# Patient Record
Sex: FEMALE | Race: WHITE | NOT HISPANIC OR LATINO | Employment: FULL TIME | URBAN - METROPOLITAN AREA
[De-identification: names, ages, dates, MRNs, and addresses within clinical notes are randomized per-mention and may not be internally consistent; named-entity substitution may affect disease eponyms.]

---

## 2023-05-04 ENCOUNTER — TELEPHONE (OUTPATIENT)
Dept: PRIMARY CARE | Facility: CLINIC | Age: 53
End: 2023-05-04
Payer: COMMERCIAL

## 2023-05-04 NOTE — TELEPHONE ENCOUNTER
Patient would like to have labs done prior to her appt next week. States shes had some light headedness the last two weeks. She would like to review labs with you at time of visit.

## 2023-05-09 ENCOUNTER — OFFICE VISIT (OUTPATIENT)
Dept: PRIMARY CARE | Facility: CLINIC | Age: 53
End: 2023-05-09
Payer: COMMERCIAL

## 2023-05-09 VITALS
DIASTOLIC BLOOD PRESSURE: 82 MMHG | WEIGHT: 173 LBS | TEMPERATURE: 98.1 F | SYSTOLIC BLOOD PRESSURE: 124 MMHG | BODY MASS INDEX: 28.14 KG/M2

## 2023-05-09 DIAGNOSIS — H65.113 ACUTE ALLERGIC SEROUS OTITIS MEDIA, BILATERAL: ICD-10-CM

## 2023-05-09 DIAGNOSIS — R42 DIZZINESSES: Primary | ICD-10-CM

## 2023-05-09 PROBLEM — G43.909 MIGRAINE HEADACHE: Status: ACTIVE | Noted: 2023-05-09

## 2023-05-09 PROBLEM — N28.9 RENAL INSUFFICIENCY: Status: ACTIVE | Noted: 2023-05-09

## 2023-05-09 PROBLEM — M54.16 LUMBAR RADICULOPATHY: Status: ACTIVE | Noted: 2023-05-09

## 2023-05-09 PROCEDURE — 1036F TOBACCO NON-USER: CPT | Performed by: NURSE PRACTITIONER

## 2023-05-09 PROCEDURE — 99213 OFFICE O/P EST LOW 20 MIN: CPT | Performed by: NURSE PRACTITIONER

## 2023-05-09 RX ORDER — ACETAMINOPHEN 500 MG
1 TABLET ORAL
COMMUNITY

## 2023-05-09 RX ORDER — FLUTICASONE PROPIONATE 50 MCG
1 SPRAY, SUSPENSION (ML) NASAL DAILY
Qty: 16 G | Refills: 0 | Status: SHIPPED | OUTPATIENT
Start: 2023-05-09 | End: 2024-05-08

## 2023-05-09 RX ORDER — MAGNESIUM 250 MG
TABLET ORAL
COMMUNITY

## 2023-05-09 RX ORDER — SUMATRIPTAN SUCCINATE 100 MG/1
TABLET ORAL
COMMUNITY
End: 2023-11-13 | Stop reason: SDUPTHER

## 2023-05-09 ASSESSMENT — ENCOUNTER SYMPTOMS
CARDIOVASCULAR NEGATIVE: 1
CONSTITUTIONAL NEGATIVE: 1
MUSCULOSKELETAL NEGATIVE: 1
RESPIRATORY NEGATIVE: 1
GASTROINTESTINAL NEGATIVE: 1

## 2023-05-09 ASSESSMENT — PATIENT HEALTH QUESTIONNAIRE - PHQ9
SUM OF ALL RESPONSES TO PHQ9 QUESTIONS 1 AND 2: 0
1. LITTLE INTEREST OR PLEASURE IN DOING THINGS: NOT AT ALL
2. FEELING DOWN, DEPRESSED OR HOPELESS: NOT AT ALL

## 2023-05-09 NOTE — PROGRESS NOTES
Subjective   Patient ID: Margareth Wolf is a 53 y.o. female who presents for Dizziness (X 2-3 weeks).    HPI Presents today for intermittent dizziness for the past week or so  Can happen anytime throughout the day. Not ore than 1-2 times a day  Last for a few minutes and then goes away.  If busy she will not notice it.   Left ear feels full.  NO headache, sleeping well   Gave up 1 cup of coffee in the afternoon about the same time this started    Review of Systems   Constitutional: Negative.    HENT:          As noted in HPI   Respiratory: Negative.     Cardiovascular: Negative.    Gastrointestinal: Negative.    Musculoskeletal: Negative.        Objective   /82 (BP Location: Left arm, Patient Position: Sitting)   Temp 36.7 °C (98.1 °F) (Temporal)   Wt 78.5 kg (173 lb)   BMI 28.14 kg/m²     Physical Exam  Constitutional:       Appearance: Normal appearance.   HENT:      Head: Normocephalic and atraumatic.      Right Ear: Ear canal and external ear normal. A middle ear effusion is present.      Left Ear: Ear canal and external ear normal. A middle ear effusion is present.      Nose: Nose normal.      Mouth/Throat:      Mouth: Mucous membranes are dry.      Pharynx: Oropharynx is clear.   Cardiovascular:      Rate and Rhythm: Normal rate and regular rhythm.   Pulmonary:      Effort: Pulmonary effort is normal.      Breath sounds: Normal breath sounds.   Musculoskeletal:         General: Normal range of motion.   Neurological:      General: No focal deficit present.      Mental Status: She is alert.   Psychiatric:         Mood and Affect: Mood normal.         Behavior: Behavior normal.         Assessment/Plan   Problem List Items Addressed This Visit    None  Visit Diagnoses       Dizzinesses    -  Primary    Acute allergic serous otitis media, bilateral        Relevant Medications    fluticasone (Flonase) 50 mcg/actuation nasal spray

## 2023-11-13 ENCOUNTER — LAB (OUTPATIENT)
Dept: LAB | Facility: LAB | Age: 53
End: 2023-11-13
Payer: COMMERCIAL

## 2023-11-13 ENCOUNTER — OFFICE VISIT (OUTPATIENT)
Dept: PRIMARY CARE | Facility: CLINIC | Age: 53
End: 2023-11-13
Payer: COMMERCIAL

## 2023-11-13 VITALS
HEART RATE: 71 BPM | OXYGEN SATURATION: 98 % | WEIGHT: 165 LBS | TEMPERATURE: 97.7 F | BODY MASS INDEX: 27.49 KG/M2 | HEIGHT: 65 IN | DIASTOLIC BLOOD PRESSURE: 82 MMHG | SYSTOLIC BLOOD PRESSURE: 132 MMHG

## 2023-11-13 DIAGNOSIS — Z00.00 HEALTHCARE MAINTENANCE: ICD-10-CM

## 2023-11-13 DIAGNOSIS — G43.119 INTRACTABLE MIGRAINE WITH AURA WITHOUT STATUS MIGRAINOSUS: ICD-10-CM

## 2023-11-13 DIAGNOSIS — Z23 NEED FOR VACCINATION: ICD-10-CM

## 2023-11-13 DIAGNOSIS — Z00.00 HEALTHCARE MAINTENANCE: Primary | ICD-10-CM

## 2023-11-13 DIAGNOSIS — D32.0 INTRACRANIAL MENINGIOMA (MULTI): ICD-10-CM

## 2023-11-13 LAB
ALBUMIN SERPL BCP-MCNC: 4.5 G/DL (ref 3.4–5)
ALP SERPL-CCNC: 65 U/L (ref 33–110)
ALT SERPL W P-5'-P-CCNC: 7 U/L (ref 7–45)
ANION GAP SERPL CALC-SCNC: 11 MMOL/L (ref 10–20)
AST SERPL W P-5'-P-CCNC: 13 U/L (ref 9–39)
BASOPHILS # BLD AUTO: 0.09 X10*3/UL (ref 0–0.1)
BASOPHILS NFR BLD AUTO: 1.1 %
BILIRUB SERPL-MCNC: 0.6 MG/DL (ref 0–1.2)
BUN SERPL-MCNC: 19 MG/DL (ref 6–23)
CALCIUM SERPL-MCNC: 9.5 MG/DL (ref 8.6–10.3)
CHLORIDE SERPL-SCNC: 104 MMOL/L (ref 98–107)
CHOLEST SERPL-MCNC: 206 MG/DL (ref 0–199)
CHOLESTEROL/HDL RATIO: 4.4
CO2 SERPL-SCNC: 27 MMOL/L (ref 21–32)
CREAT SERPL-MCNC: 0.95 MG/DL (ref 0.5–1.05)
EOSINOPHIL # BLD AUTO: 0.19 X10*3/UL (ref 0–0.7)
EOSINOPHIL NFR BLD AUTO: 2.3 %
ERYTHROCYTE [DISTWIDTH] IN BLOOD BY AUTOMATED COUNT: 12.7 % (ref 11.5–14.5)
GFR SERPL CREATININE-BSD FRML MDRD: 71 ML/MIN/1.73M*2
GLUCOSE SERPL-MCNC: 89 MG/DL (ref 74–99)
HCT VFR BLD AUTO: 40.7 % (ref 36–46)
HDLC SERPL-MCNC: 46.7 MG/DL
HGB BLD-MCNC: 13.3 G/DL (ref 12–16)
IMM GRANULOCYTES # BLD AUTO: 0.03 X10*3/UL (ref 0–0.7)
IMM GRANULOCYTES NFR BLD AUTO: 0.4 % (ref 0–0.9)
LDLC SERPL CALC-MCNC: 129 MG/DL
LYMPHOCYTES # BLD AUTO: 2.81 X10*3/UL (ref 1.2–4.8)
LYMPHOCYTES NFR BLD AUTO: 33.6 %
MCH RBC QN AUTO: 29.1 PG (ref 26–34)
MCHC RBC AUTO-ENTMCNC: 32.7 G/DL (ref 32–36)
MCV RBC AUTO: 89 FL (ref 80–100)
MONOCYTES # BLD AUTO: 0.52 X10*3/UL (ref 0.1–1)
MONOCYTES NFR BLD AUTO: 6.2 %
NEUTROPHILS # BLD AUTO: 4.73 X10*3/UL (ref 1.2–7.7)
NEUTROPHILS NFR BLD AUTO: 56.4 %
NON HDL CHOLESTEROL: 159 MG/DL (ref 0–149)
NRBC BLD-RTO: 0 /100 WBCS (ref 0–0)
PLATELET # BLD AUTO: 307 X10*3/UL (ref 150–450)
POTASSIUM SERPL-SCNC: 3.9 MMOL/L (ref 3.5–5.3)
PROT SERPL-MCNC: 7 G/DL (ref 6.4–8.2)
RBC # BLD AUTO: 4.57 X10*6/UL (ref 4–5.2)
SODIUM SERPL-SCNC: 138 MMOL/L (ref 136–145)
TRIGL SERPL-MCNC: 150 MG/DL (ref 0–149)
TSH SERPL-ACNC: 3.68 MIU/L (ref 0.44–3.98)
VLDL: 30 MG/DL (ref 0–40)
WBC # BLD AUTO: 8.4 X10*3/UL (ref 4.4–11.3)

## 2023-11-13 PROCEDURE — 80053 COMPREHEN METABOLIC PANEL: CPT

## 2023-11-13 PROCEDURE — 1036F TOBACCO NON-USER: CPT | Performed by: NURSE PRACTITIONER

## 2023-11-13 PROCEDURE — 85025 COMPLETE CBC W/AUTO DIFF WBC: CPT

## 2023-11-13 PROCEDURE — 84443 ASSAY THYROID STIM HORMONE: CPT

## 2023-11-13 PROCEDURE — 90715 TDAP VACCINE 7 YRS/> IM: CPT | Performed by: NURSE PRACTITIONER

## 2023-11-13 PROCEDURE — 99396 PREV VISIT EST AGE 40-64: CPT | Performed by: NURSE PRACTITIONER

## 2023-11-13 PROCEDURE — 36415 COLL VENOUS BLD VENIPUNCTURE: CPT

## 2023-11-13 PROCEDURE — 80061 LIPID PANEL: CPT

## 2023-11-13 PROCEDURE — 90471 IMMUNIZATION ADMIN: CPT | Performed by: NURSE PRACTITIONER

## 2023-11-13 RX ORDER — SUMATRIPTAN SUCCINATE 100 MG/1
100 TABLET ORAL ONCE AS NEEDED
Qty: 9 TABLET | Refills: 3 | Status: SHIPPED | OUTPATIENT
Start: 2023-11-13 | End: 2024-11-12

## 2023-11-13 ASSESSMENT — ENCOUNTER SYMPTOMS
GASTROINTESTINAL NEGATIVE: 1
NEUROLOGICAL NEGATIVE: 1
MUSCULOSKELETAL NEGATIVE: 1
CONSTITUTIONAL NEGATIVE: 1
PSYCHIATRIC NEGATIVE: 1
CARDIOVASCULAR NEGATIVE: 1
RESPIRATORY NEGATIVE: 1

## 2023-11-13 NOTE — PROGRESS NOTES
"Subjective   Patient ID: Margareth Wolf is a 53 y.o. female who presents for Annual Exam (Patient has GYN-Dr. Monae Isbell).    HPI   Presents today for Health maintnenace exam  MRI yeary for  intracarnial  meingioma  Migraines much better, much vesna frequent.  Going to chiropractor. , through [facility] and using flonase.  Overall health is excellent  Dental cleaning every 6 month.  Brush and floss 2 times daily.   Eye exam yearly. Uses readers.   No hearng loss  Skin cehcks yearly.   Well baalnced diet, down 11 pounds.   Exercises walks 39117 steps daily and weight.   Immiunizatiosn as noted.  Due for tdap  Paps normal, mammogram normal 11/1/2023.  Colonoscopy 7/2023 2 polyps removed 5 year recheck      Review of Systems   Constitutional: Negative.    HENT: Negative.     Respiratory: Negative.     Cardiovascular: Negative.    Gastrointestinal: Negative.    Genitourinary: Negative.    Musculoskeletal: Negative.    Skin: Negative.    Neurological: Negative.    Psychiatric/Behavioral: Negative.         Objective   /88 (BP Location: Right arm, Patient Position: Sitting)   Pulse 71   Temp 36.5 °C (97.7 °F) (Temporal)   Ht 1.651 m (5' 5\")   Wt 74.8 kg (165 lb)   SpO2 98%   BMI 27.46 kg/m²     Physical Exam  Constitutional:       Appearance: Normal appearance.   HENT:      Right Ear: Tympanic membrane, ear canal and external ear normal.      Left Ear: Tympanic membrane, ear canal and external ear normal.      Nose: Nose normal.      Mouth/Throat:      Mouth: Mucous membranes are moist.      Pharynx: Oropharynx is clear.   Eyes:      Pupils: Pupils are equal, round, and reactive to light.   Cardiovascular:      Rate and Rhythm: Normal rate and regular rhythm.      Pulses: Normal pulses.   Pulmonary:      Effort: Pulmonary effort is normal.   Abdominal:      General: Abdomen is flat. Bowel sounds are normal.      Palpations: Abdomen is soft.   Musculoskeletal:         General: Normal range of motion.      " Cervical back: Normal range of motion.   Lymphadenopathy:      Cervical: No cervical adenopathy.   Skin:     General: Skin is warm.   Neurological:      General: No focal deficit present.      Mental Status: She is alert and oriented to person, place, and time.   Psychiatric:         Mood and Affect: Mood normal.         Behavior: Behavior normal.         Assessment/Plan   Problem List Items Addressed This Visit             ICD-10-CM    Migraine headache G43.909    Relevant Medications    SUMAtriptan (Imitrex) 100 mg tablet    Intracranial meningioma (CMS/HCC) D32.0     Other Visit Diagnoses         Codes    Healthcare maintenance    -  Primary Z00.00    Relevant Orders    Comprehensive Metabolic Panel    Lipid Panel    Thyroid Stimulating Hormone    CBC and Auto Differential    Need for vaccination     Z23    Relevant Orders    Tdap vaccine, age 7 years and older  (BOOSTRIX) (Completed)

## 2023-11-13 NOTE — PATIENT INSTRUCTIONS
I will follow up with the blood work.  Tdap updated today.   Follow up in 1 year and as needed.   Medications renewed.

## 2024-02-20 ENCOUNTER — TELEPHONE (OUTPATIENT)
Dept: PRIMARY CARE | Facility: CLINIC | Age: 54
End: 2024-02-20
Payer: COMMERCIAL

## 2024-02-20 DIAGNOSIS — R06.83 SNORING: Primary | ICD-10-CM

## 2024-02-20 DIAGNOSIS — R53.83 FATIGUE, UNSPECIFIED TYPE: ICD-10-CM

## 2024-02-20 NOTE — TELEPHONE ENCOUNTER
Patient informed of above information and verbalized understanding, gave contact info for scheduling

## 2024-02-20 NOTE — TELEPHONE ENCOUNTER
Patient requesting referral for sleep apnea clinic for testing, states she has been snoring and having bad headaches in the mornings. Would like to see if this is why she is having these headaches in the mornings. Please advise

## 2024-04-09 ENCOUNTER — OFFICE VISIT (OUTPATIENT)
Dept: PRIMARY CARE | Facility: CLINIC | Age: 54
End: 2024-04-09
Payer: COMMERCIAL

## 2024-04-09 ENCOUNTER — LAB (OUTPATIENT)
Dept: LAB | Facility: LAB | Age: 54
End: 2024-04-09
Payer: COMMERCIAL

## 2024-04-09 VITALS
DIASTOLIC BLOOD PRESSURE: 82 MMHG | SYSTOLIC BLOOD PRESSURE: 120 MMHG | TEMPERATURE: 98.1 F | OXYGEN SATURATION: 98 % | HEART RATE: 78 BPM | WEIGHT: 161 LBS | HEIGHT: 65 IN | BODY MASS INDEX: 26.82 KG/M2

## 2024-04-09 DIAGNOSIS — R00.0 TACHYCARDIA: Primary | ICD-10-CM

## 2024-04-09 DIAGNOSIS — R00.0 TACHYCARDIA: ICD-10-CM

## 2024-04-09 DIAGNOSIS — E78.2 MIXED HYPERLIPIDEMIA: ICD-10-CM

## 2024-04-09 LAB
ALBUMIN SERPL BCP-MCNC: 4.6 G/DL (ref 3.4–5)
ALP SERPL-CCNC: 60 U/L (ref 33–110)
ALT SERPL W P-5'-P-CCNC: 9 U/L (ref 7–45)
ANION GAP SERPL CALC-SCNC: 11 MMOL/L (ref 10–20)
AST SERPL W P-5'-P-CCNC: 15 U/L (ref 9–39)
BILIRUB SERPL-MCNC: 0.5 MG/DL (ref 0–1.2)
BUN SERPL-MCNC: 21 MG/DL (ref 6–23)
CALCIUM SERPL-MCNC: 9.7 MG/DL (ref 8.6–10.3)
CHLORIDE SERPL-SCNC: 105 MMOL/L (ref 98–107)
CO2 SERPL-SCNC: 29 MMOL/L (ref 21–32)
CREAT SERPL-MCNC: 0.95 MG/DL (ref 0.5–1.05)
EGFRCR SERPLBLD CKD-EPI 2021: 70 ML/MIN/1.73M*2
ERYTHROCYTE [DISTWIDTH] IN BLOOD BY AUTOMATED COUNT: 12.5 % (ref 11.5–14.5)
GLUCOSE SERPL-MCNC: 77 MG/DL (ref 74–99)
HCT VFR BLD AUTO: 39.3 % (ref 36–46)
HGB BLD-MCNC: 13 G/DL (ref 12–16)
MCH RBC QN AUTO: 28.9 PG (ref 26–34)
MCHC RBC AUTO-ENTMCNC: 33.1 G/DL (ref 32–36)
MCV RBC AUTO: 87 FL (ref 80–100)
NRBC BLD-RTO: 0 /100 WBCS (ref 0–0)
PLATELET # BLD AUTO: 330 X10*3/UL (ref 150–450)
POTASSIUM SERPL-SCNC: 4.4 MMOL/L (ref 3.5–5.3)
PROT SERPL-MCNC: 7.4 G/DL (ref 6.4–8.2)
RBC # BLD AUTO: 4.5 X10*6/UL (ref 4–5.2)
SODIUM SERPL-SCNC: 141 MMOL/L (ref 136–145)
TSH SERPL-ACNC: 2.56 MIU/L (ref 0.44–3.98)
WBC # BLD AUTO: 8.3 X10*3/UL (ref 4.4–11.3)

## 2024-04-09 PROCEDURE — 99214 OFFICE O/P EST MOD 30 MIN: CPT | Performed by: NURSE PRACTITIONER

## 2024-04-09 PROCEDURE — 93000 ELECTROCARDIOGRAM COMPLETE: CPT | Performed by: NURSE PRACTITIONER

## 2024-04-09 PROCEDURE — 36415 COLL VENOUS BLD VENIPUNCTURE: CPT

## 2024-04-09 PROCEDURE — 85027 COMPLETE CBC AUTOMATED: CPT

## 2024-04-09 PROCEDURE — 80053 COMPREHEN METABOLIC PANEL: CPT

## 2024-04-09 PROCEDURE — 1036F TOBACCO NON-USER: CPT | Performed by: NURSE PRACTITIONER

## 2024-04-09 PROCEDURE — 84443 ASSAY THYROID STIM HORMONE: CPT

## 2024-04-09 RX ORDER — TRETINOIN 0.25 MG/G
CREAM TOPICAL
COMMUNITY
Start: 2024-03-18

## 2024-04-09 ASSESSMENT — ENCOUNTER SYMPTOMS
PALPITATIONS: 0
RESPIRATORY NEGATIVE: 1
MUSCULOSKELETAL NEGATIVE: 1
PSYCHIATRIC NEGATIVE: 1
CONSTITUTIONAL NEGATIVE: 1
NEUROLOGICAL NEGATIVE: 1
CARDIOVASCULAR NEGATIVE: 1

## 2024-04-09 NOTE — PROGRESS NOTES
"Subjective   Patient ID: Margareth Wolf is a 54 y.o. female who presents for Rapid Heart Rate.    HPI Presents today for  a few episodes where she noted her heart was racing. One time she was watching TV and noticed it was racing.  She looked at her watch and it said her heart rate was 130.  It lasted a few minutes and then went away  Not short of breath  Can feel with activity and with rest  Feels ok otherwise.  Not feeling anxious, stress does not a trigger.  Started 4 weeks ago.  Happened for a few weeks on occasion and now in the last 2 weeks no issue   Not taking any over the counter  medication.  Takes Magnesium and vitamin d daily  No issue with exercise.   Blood work 11/2023 reviewed as noted  Is wondering of she should do the coronary calcium score  Review of Systems   Constitutional: Negative.    Respiratory: Negative.     Cardiovascular: Negative.  Negative for chest pain, palpitations and leg swelling.        As noted in HPI     Musculoskeletal: Negative.    Neurological: Negative.    Psychiatric/Behavioral: Negative.         Objective   /82 (BP Location: Left arm, Patient Position: Sitting)   Pulse 78   Temp 36.7 °C (98.1 °F) (Temporal)   Ht 1.651 m (5' 5\")   Wt 73 kg (161 lb)   SpO2 98%   BMI 26.79 kg/m²     Physical Exam  Constitutional:       Appearance: Normal appearance.   Cardiovascular:      Rate and Rhythm: Normal rate and regular rhythm.   Pulmonary:      Effort: Pulmonary effort is normal.      Breath sounds: Normal breath sounds.   Musculoskeletal:         General: Normal range of motion.   Neurological:      General: No focal deficit present.      Mental Status: She is alert.   Psychiatric:         Mood and Affect: Mood normal.         Behavior: Behavior normal.         Assessment/Plan   Problem List Items Addressed This Visit    None  Visit Diagnoses         Codes    Tachycardia    -  Primary R00.0    Relevant Orders    ECG 12 lead (Clinic Performed)    TSH with reflex to Free " T4 if abnormal    CBC    Comprehensive Metabolic Panel    Mixed hyperlipidemia     E78.2    Relevant Orders    CT cardiac scoring wo IV contrast

## 2024-04-09 NOTE — PATIENT INSTRUCTIONS
EKG today is normal.    I will follow up with the blood work.   Please have the calcium score done.   If returns will consider and Holter monitor

## 2024-04-29 ENCOUNTER — OFFICE VISIT (OUTPATIENT)
Dept: SLEEP MEDICINE | Facility: CLINIC | Age: 54
End: 2024-04-29
Payer: COMMERCIAL

## 2024-04-29 VITALS
BODY MASS INDEX: 27.49 KG/M2 | DIASTOLIC BLOOD PRESSURE: 79 MMHG | HEART RATE: 83 BPM | SYSTOLIC BLOOD PRESSURE: 116 MMHG | TEMPERATURE: 98 F | WEIGHT: 165 LBS | HEIGHT: 65 IN | OXYGEN SATURATION: 100 %

## 2024-04-29 DIAGNOSIS — G47.30 SLEEP APNEA, UNSPECIFIED TYPE: Primary | ICD-10-CM

## 2024-04-29 DIAGNOSIS — R53.83 FATIGUE, UNSPECIFIED TYPE: ICD-10-CM

## 2024-04-29 DIAGNOSIS — R06.83 SNORING: ICD-10-CM

## 2024-04-29 PROCEDURE — 1036F TOBACCO NON-USER: CPT | Performed by: PHYSICIAN ASSISTANT

## 2024-04-29 PROCEDURE — 99203 OFFICE O/P NEW LOW 30 MIN: CPT | Performed by: PHYSICIAN ASSISTANT

## 2024-04-29 NOTE — PATIENT INSTRUCTIONS
[facility] Sleep Medicine  [address]  [address] [address]       NAME: Margareth Wolf   DATE: 04/29/24    Your Sleep Provider Today: Dorene Yuen PA-C  Your Primary Care Physician: KAYLEN Puentes   Your Referring Provider: Belinda Asencio APRN-C*    DIAGNOSIS:   1. Sleep apnea, unspecified type  Home sleep apnea test (HSAT)      2. Snoring  Referral to Adult Sleep Medicine      3. Fatigue, unspecified type  Referral to Adult Sleep Medicine          Thank you for coming to the Sleep Medicine Clinic today! Your sleep medicine provider today was: Dorene Yuen PA-C Below is a summary of your treatment plan, other important information, and our contact numbers:      TREATMENT PLAN       Instructions - Common BHAKTI Recs: - Avoid alcohol or sedatives several hours prior to sleeping.   - Avoid driving or operating heavy machinery when drowsy. A person driving while sleepy is five (5) times more likely to have an accident. If you feel sleepy, pull over and take a short power nap (sleep for less than 30 minutes). Otherwise, ask somebody to drive you.          Follow-up Appointment:   With Ade wu Christine       IMPORTANT INFORMATION     Call 911 for medical emergencies.  Our offices are generally open from Monday-Friday, 9 am - 5 pm.  If you need to get in touch with me, you may either call me and my team(number is below) or you can use Whyd.  If a referral for a test, for CPAP, or for another specialist was made, and you have not heard about scheduling this within a week, please call scheduling at [phone]).  If you are unable to make your appointment for clinic or an overnight study, kindly call the office at least 48 hours in advance to cancel and reschedule.  If you are on CPAP, please bring your device's card or the device to each clinic appointment.   There are no supporting services by either the sleep doctors or their  staff on weekends and Holidays, or after 5 PM on weekdays.   If you have been asked to come to a sleep study, make sure you bring toiletries, a comfy pillow, and any nighttime medications that you may regularly take. Also be sure to eat dinner before you arrive. We generally do not provide meals.      PRESCRIPTIONS     We require 7 days advanced notice for prescription refills. If we do not receive the request in this time, we cannot guarantee that your medication will be refilled in time.      IMPORTANT PHONE NUMBERS     Sleep Medicine Clinic Fax: [phone]  Appointments (for Adult Sleep Clinic): [phone] - option 2  Appointments (For Sleep Studies): [phone] - option 3  Behavioral Sleep Medicine: [phone]  Sleep Surgery: [phone]  ENT (Otolaryngology): [phone]  Headache Clinic (Neurology): [phone]  Neurology: [phone]  Psychiatry: [phone]  Pulmonary Function Testing (PFT) Center: [phone]  Pulmonary Medicine: [phone]  [employer] (DME): [phone]  [employer] (DME): [phone]  [facility] ([facility]): [phone]      OUR ADULT SLEEP MEDICINE TEAM   Please do not hesitate to call the office or sleep nurse with any questions between appointments:    Adult Sleep Nurses (Preethi Menjivar, RN and Tammy Canada RN):  For clinical questions and refilling prescriptions: [phone]  Email sleep diaries and other documents at: [email or web address]    Adult Sleep Medicine Secretaries:  Buffy Mckeon (For Luis Enrique/[place]/Yeh/Rodrigues):   P: [phone]  F: [phone]  Angy Camacho (For Soares/[place]biller): P: [phone]  Fax: [phone]  Oumou Verma (For Jurcevic/Blank): P: [phone]  F: [phone]  Laverne Silverman (For [place]): P: [phone]  F: [phone]  Carey Marino (For Heidi/Waqas/Daniel): P: [phone]  F: [phone]  Isa Nazario (For Francois/Elpidio): P: [phone]  F:  [phone]     Adult Sleep Medicine Advanced Practice Providers:  Marcus Nunez ([place], [place])  Cheyenne Alan ([facility])  Ade Reed CNP ([place], [place], [place])  Christina Yuen CNP ([place], [place], [place])  Ana Kelley ([place], [place], [place])  Kathi Magallanes CNP ([place], [place])        OUR SLEEP TESTING LOCATIONS     Our team will contact you to schedule your sleep study, however, you can contact us as follow:  Main Phone Line (scheduling only): [phone]), option 3  Adult and Pediatric Locations   [place] (6 years and older): [facility] - 4th floor ([address]) After hours line: [phone]  [facility] (Main campus: All ages): [facility], 6th floor. After hours line: [phone]   [place] (5 years and older; younger considered on case-by-case basis): [address], [address]. Scheduling  After hours line: [phone]   [place] (6 years and older): [address]; [address]; [address]   [place] (6 years and older): [address] [facility] [address] [facility], [address]  After hours line: [phone]   [place] (13 years and older) in [place]: [address], 2nd floor  After hours line: [phone]   [place] (13 year and older): [address]  After hours line: [phone]     Adult Only Locations:   [place] (18 years and older): [address] [facility], 2nd floor   [place] (18 years and older): [address] [facility]; 4th floor  After hours line: [phone]   [place] (18 years and older) at [place]: [id number] [place] [address]  After hours line: [phone]          CONTACTING YOUR SLEEP MEDICINE PROVIDER     Send a message directly to your provider through \"My Chart\", which is the email service through your  Records Account: [email or web address]   Call [phone] and leave a message. One of the " "administrative assistants will forward the message to your sleep medicine provider through \"My Chart\" and/or email.     Your sleep medicine provider for this visit was: Dorene Yuen PA-C    "

## 2024-04-29 NOTE — PROGRESS NOTES
Patient: Margareth Wolf    [id number]  : 1970 -- AGE 54 y.o.    Provider: Dorene Yuen PA-C     Location [facility]   Service Date: 2024              [facility] Sleep Medicine Clinic  New Visit Note      HISTORY OF PRESENT ILLNESS     The patient's referring provider is: Belinda Asencio, ELI*; KAYLEN Puentes    HISTORY OF PRESENT ILLNESS   Margareth Wolf is a 54 y.o. female with h/o migraine, lumbar radiculopathy  who presents to a [facility] Sleep Medicine Clinic for a sleep medicine evaluation with concerns of New Patient Visit (New patient evaluation ).     Past Sleep History  Patient has not had a sleep study in the past.        Current History    On today's visit, the patient reports he is here and concerned that she may have sleep apnea.  She reports that her mother and one of her siblings have it.  She reports that she has a history of snoring, noted to be more mild over the past several years but does feel that has been going on for most of her adult life.  She reports that she overall feels that she sleeps pretty good, does endorse some nocturia though.  She does not wake up any gasping or choking sensation.  She does report at times in the morning does have a history of migraines.  Does have a history of clenching and does have a mouthguard.  She reports that since having mouthguard that also seem to be helping her headaches as well.  She reports that when she does get up in the middle of the night, she has returned to sleep with ease.  Scheduled and as below.  She denies any new sleep concerns at this time including restless leg syndrome, parasomnias or other concerns.  Denies any issues with excessive daytime sleepiness interfere with work, driving or daily tasks.     Sleep schedule:  In bed: 11P  Subjective sleep latency:  15 minutes  Awakenings during night:  1-2 - nocturia  Length of awakenings:  few minutes  Final awakening time:   7A  Naps: none    Overall estimate of total sleep time: 8 hours     Preferred sleeping position: SLEEP POSITION: supine and sidelying      ESS:  3  KATHLEEN:  4  FOSQ: 39      REVIEW OF SYSTEMS     REVIEW OF SYSTEMS  See HPI; all other ROS were reviewed and negative for compliant        ALLERGIES AND MEDICATIONS     ALLERGIES  Allergies   Allergen Reactions    Clarithromycin Palpitations and Rash       MEDICATIONS  Current Outpatient Medications   Medication Sig Dispense Refill    BIOTIN ORAL Take by mouth.      cholecalciferol (Vitamin D-3) 50 mcg (2,000 unit) capsule Take 1 tablet by mouth.      fluticasone (Flonase) 50 mcg/actuation nasal spray Administer 1 spray into each nostril once daily. Shake gently. Before first use, prime pump. After use, clean tip and replace cap. 16 g 0    magnesium 250 mg tablet Take by mouth.      SUMAtriptan (Imitrex) 100 mg tablet Take 1 tablet (100 mg) by mouth 1 time if needed for migraine. 9 tablet 3    tretinoin (Retin-A) 0.025 % cream APPLY PEA SIZED AMOUNT TO FACE EVERY OTHER NIGHT AND INCREASE TO EVERY NIGHT AS TOLERATED.       No current facility-administered medications for this visit.         PAST HISTORY     PAST MEDICAL HISTORY  She  has a past medical history of Contusion of foot (09/15/2015), Infected blister of foot (09/15/2015), and Referred otalgia (05/11/2016).    PAST SURGICAL HISTORY:  No past surgical history on file.    FAMILY HISTORY  Family History   Problem Relation Name Age of Onset    Fibromyalgia Mother      Breast cancer Mother      Prostate cancer Father         She does have a family history of sleep disorder.    SOCIAL HISTORY  She  reports that she has never smoked. She has never been exposed to tobacco smoke. She has never used smokeless tobacco. No history on file for alcohol use and drug use. She    Caffeine consumption: Yes, 2 cups/day  Alcohol consumption: Yes, rarely (occasional)  Marijuana: No      PHYSICAL EXAM     VITAL SIGNS: /79 (BP  "Location: Right arm, Patient Position: Sitting, BP Cuff Size: Adult)   Pulse 83   Temp 36.7 °C (98 °F) (Temporal)   Ht 1.651 m (5' 5\")   Wt 74.8 kg (165 lb)   SpO2 100%   BMI 27.46 kg/m²      CURRENT WEIGHT:   Vitals:    04/29/24 0853   Weight: 74.8 kg (165 lb)     Body mass index is 27.46 kg/m².  PREVIOUS WEIGHTS:  Wt Readings from Last 3 Encounters:   04/29/24 74.8 kg (165 lb)   04/09/24 73 kg (161 lb)   11/13/23 74.8 kg (165 lb)       Constitutional: Alert and oriented, cooperative, no acute distress  Head: Normocephalic, atraumatic   Cranial Features: No abnormal craniofacial features   Neck: Supple. Trachea midline.  Pulmonary: Non-labored breathing, speaks in full sentences.   Cardiac: regular rate   Extremities: No clubbing, no edema  Neuromuscular: Cranial nerves grossly intact, no focal deficits      RESULTS/DATA     Bicarbonate (mmol/L)   Date Value   04/09/2024 29   11/13/2023 27   09/12/2022 28   09/08/2021 27   09/09/2020 25             ASSESSMENT/PLAN     Ms. Wolf is a 54 y.o. female and She was referred to the [facility] for evaluation of possible sleep disordered breathing    Problem List, Orders, Assessment, Recommendations:  Problem List Items Addressed This Visit             ICD-10-CM    Sleep apnea - Primary G47.30     -will obtain HSAT  -avoid supine sleep  -has been working on weight loss, reports successful 15lb weight loss; keep up the good work         Relevant Orders    Home sleep apnea test (HSAT)    Follow Up In Adult Sleep Medicine     Other Visit Diagnoses         Codes    Snoring     R06.83    Fatigue, unspecified type     R53.83            Disposition    Return to clinic in 3-4 months  -may follow with Ade Reed due to proximity of [place] location to her house; information for scheduling provided       "

## 2024-04-29 NOTE — ASSESSMENT & PLAN NOTE
-will obtain HSAT  -avoid supine sleep  -has been working on weight loss, reports successful 15lb weight loss; keep up the good work

## 2024-05-14 ENCOUNTER — CLINICAL SUPPORT (OUTPATIENT)
Dept: SLEEP MEDICINE | Facility: CLINIC | Age: 54
End: 2024-05-14
Payer: COMMERCIAL

## 2024-05-14 DIAGNOSIS — G47.30 SLEEP APNEA, UNSPECIFIED TYPE: ICD-10-CM

## 2024-05-14 DIAGNOSIS — G47.33 OBSTRUCTIVE SLEEP APNEA (ADULT) (PEDIATRIC): ICD-10-CM

## 2024-05-14 PROCEDURE — 95806 SLEEP STUDY UNATT&RESP EFFT: CPT | Performed by: PSYCHIATRY & NEUROLOGY

## 2024-05-14 NOTE — PROGRESS NOTES
Type of Study: HOME SLEEP STUDY - NOMAD     The patient received equipment and instructions for use of the [employer] Nomad HSAT device. The patient was instructed how to apply the effort belts, cannula, thermistor. It was also explained how the Nomad and oximeter components work.  The patient was asked to record their sleep for an 8-hour period.     The patient was informed of their responsibility for the device and acknowledged this by signing the HSAT device contract. The patient was asked to return the device on 05/15/2024 by 10 AM to the Respiratory Care Department at [facility].     The patient was instructed to call 911 as usual for any medical- emergencies while at home.  The patient was also given a phone number for troubleshooting when using the device in case there were additional questions.

## 2024-07-09 ENCOUNTER — APPOINTMENT (OUTPATIENT)
Dept: PRIMARY CARE | Facility: CLINIC | Age: 54
End: 2024-07-09
Payer: COMMERCIAL

## 2024-07-09 VITALS
HEART RATE: 84 BPM | SYSTOLIC BLOOD PRESSURE: 122 MMHG | DIASTOLIC BLOOD PRESSURE: 72 MMHG | OXYGEN SATURATION: 96 % | WEIGHT: 159 LBS | TEMPERATURE: 98.1 F | BODY MASS INDEX: 26.46 KG/M2

## 2024-07-09 DIAGNOSIS — M54.9 UPPER BACK PAIN ON RIGHT SIDE: Primary | ICD-10-CM

## 2024-07-09 DIAGNOSIS — R19.7 DIARRHEA, UNSPECIFIED TYPE: ICD-10-CM

## 2024-07-09 PROCEDURE — 99213 OFFICE O/P EST LOW 20 MIN: CPT | Performed by: NURSE PRACTITIONER

## 2024-07-09 PROCEDURE — 1036F TOBACCO NON-USER: CPT | Performed by: NURSE PRACTITIONER

## 2024-07-09 ASSESSMENT — ENCOUNTER SYMPTOMS
CARDIOVASCULAR NEGATIVE: 1
PSYCHIATRIC NEGATIVE: 1
MUSCULOSKELETAL NEGATIVE: 1
NEUROLOGICAL NEGATIVE: 1
RESPIRATORY NEGATIVE: 1
CONSTITUTIONAL NEGATIVE: 1
ROS GI COMMENTS: AS NOTED IN HPI

## 2024-07-09 NOTE — PATIENT INSTRUCTIONS
Please ice the area.   Can use Emla or Voltaren gel to the area.   If no better in 1-2 weeks please start physical therapy

## 2024-07-09 NOTE — PROGRESS NOTES
Subjective   Patient ID: Margareth Wolf is a 54 y.o. female who presents for Shoulder Pain (Right sided-with burning x 1 month).    HPI Presents today for right upper back pain on and off for a month.   No loss of strength or numbness or tingling into her right arm.   Np known injury.   Had started Nutrofol for hair growth and she got diarrhea immediately.  Has s topped it and took probiotics and is now better.     Review of Systems   Constitutional: Negative.    Respiratory: Negative.     Cardiovascular: Negative.    Gastrointestinal:         As noted in HPI     Musculoskeletal: Negative.         As noted in HPI     Neurological: Negative.    Psychiatric/Behavioral: Negative.         Objective   /72 (BP Location: Right arm, Patient Position: Sitting)   Pulse 84   Temp 36.7 °C (98.1 °F) (Temporal)   Wt 72.1 kg (159 lb)   SpO2 96%   BMI 26.46 kg/m²     Physical Exam  Constitutional:       Appearance: Normal appearance.   Cardiovascular:      Rate and Rhythm: Normal rate and regular rhythm.   Pulmonary:      Effort: Pulmonary effort is normal.      Breath sounds: Normal breath sounds.   Abdominal:      General: Abdomen is flat. Bowel sounds are normal.      Palpations: Abdomen is soft.   Musculoskeletal:         General: Normal range of motion.   Neurological:      General: No focal deficit present.      Mental Status: She is alert.   Psychiatric:         Mood and Affect: Mood normal.         Behavior: Behavior normal.         Assessment/Plan   Problem List Items Addressed This Visit    None  Visit Diagnoses         Codes    Upper back pain on right side    -  Primary M54.9    Relevant Orders    Referral to Physical Therapy

## 2024-07-12 ENCOUNTER — APPOINTMENT (OUTPATIENT)
Dept: RADIOLOGY | Facility: CLINIC | Age: 54
End: 2024-07-12
Payer: COMMERCIAL

## 2024-07-12 ENCOUNTER — HOSPITAL ENCOUNTER (OUTPATIENT)
Dept: RADIOLOGY | Facility: CLINIC | Age: 54
Discharge: HOME | End: 2024-07-12
Payer: COMMERCIAL

## 2024-07-12 DIAGNOSIS — E78.2 MIXED HYPERLIPIDEMIA: ICD-10-CM

## 2024-07-12 PROCEDURE — 75571 CT HRT W/O DYE W/CA TEST: CPT

## 2024-08-26 ENCOUNTER — APPOINTMENT (OUTPATIENT)
Dept: SLEEP MEDICINE | Facility: CLINIC | Age: 54
End: 2024-08-26
Payer: COMMERCIAL

## 2024-08-26 VITALS
BODY MASS INDEX: 26.89 KG/M2 | DIASTOLIC BLOOD PRESSURE: 84 MMHG | WEIGHT: 161.4 LBS | OXYGEN SATURATION: 99 % | HEART RATE: 73 BPM | HEIGHT: 65 IN | SYSTOLIC BLOOD PRESSURE: 121 MMHG | TEMPERATURE: 96.4 F

## 2024-08-26 DIAGNOSIS — G47.30 SLEEP APNEA, UNSPECIFIED TYPE: ICD-10-CM

## 2024-08-26 PROCEDURE — 1036F TOBACCO NON-USER: CPT | Performed by: PHYSICIAN ASSISTANT

## 2024-08-26 PROCEDURE — 99213 OFFICE O/P EST LOW 20 MIN: CPT | Performed by: PHYSICIAN ASSISTANT

## 2024-08-26 PROCEDURE — 3008F BODY MASS INDEX DOCD: CPT | Performed by: PHYSICIAN ASSISTANT

## 2024-08-26 PROCEDURE — G2211 COMPLEX E/M VISIT ADD ON: HCPCS | Performed by: PHYSICIAN ASSISTANT

## 2024-08-26 NOTE — PROGRESS NOTES
Patient: Margareth Wolf    [id number]  : 1970 -- AGE 54 y.o.    Provider: Dorene Yuen PA-C     Location [facility]   Service Date: 2024              [facility]  Followup Visit Note    HISTORY OF PRESENT ILLNESS     HISTORY OF PRESENT ILLNESS   Margareth Wolf is a 54 y.o. female with h/o mild BHAKTI, migraine, lumbar radiculopathy  who presents to a [facility] for followup.       PAST SLEEP HISTORY:  HSAT- 2024  REI3%: 11 (supine- 15, non-supine 5)  REI4%: 5 (supine 7.5, non-supine 2.1)  LUIS E: 0.2  O2 ramon: 87% (T88: 0 min, mean O2 94%)    Assessment and plan from last visit: 2024  Sleep apnea - Primary G47.30        -will obtain HSAT  -avoid supine sleep  -has been working on weight loss, reports successful 15lb weight loss; keep up the good work           Relevant Orders     Home sleep apnea test (HSAT)     Follow Up In Adult Sleep Medicine         Current History    On today's visit, the patient reports would like to consider alternative treatment for sleep apnea. Was doing positional therapy. States overall she feels pretty good. However, more concerned that sometimes she rolls to back and snores. Also concerned as her mom has sleep apnea, symptoms may advance with time.   Discussed treatment options, she would like to give cpap a try.       Sleep schedule:  In bed: 11P  Subjective sleep latency:  15 minutes  Awakenings during night:  1-2 - nocturia  Length of awakenings:  few minutes  Final awakening time:  7A  Naps: none     Overall estimate of total sleep time: 8 hours       ESS:  5  KATHLEEN:  6  FOSQ: 40    REVIEW OF SYSTEMS     REVIEW OF SYSTEMS  See HPI; all other ROS were reviewed and negative for compliant      ALLERGIES AND MEDICATIONS     ALLERGIES  Allergies   Allergen Reactions    Clarithromycin Palpitations and Rash       MEDICATIONS: She has a current medication list which includes the following  "prescription(s): biotin - Take by mouth, sumatriptan - Take 1 tablet (100 mg) by mouth 1 time if needed for migraine, cholecalciferol - Take 1 tablet by mouth, fluticasone - Administer 1 spray into each nostril once daily. Shake gently. Before first use, prime pump. After use, clean tip and replace cap, magnesium - Take by mouth, and tretinoin - APPLY PEA SIZED AMOUNT TO FACE EVERY OTHER NIGHT AND INCREASE TO EVERY NIGHT AS TOLERATED.    PAST MEDICAL HISTORY : She  has a past medical history of Contusion of foot (09/15/2015), Infected blister of foot (09/15/2015), and Referred otalgia (05/11/2016).    PAST SURGICAL HISTORY: She  has no past surgical history on file.     FAMILY HISTORY: No changes since previous visit. Otherwise non-contributory as charted.     SOCIAL HISTORY  She  reports that she has never smoked. She has never been exposed to tobacco smoke. She has never used smokeless tobacco. No history on file for alcohol use and drug use.       PHYSICAL EXAM     VITAL SIGNS: /84 (BP Location: Right arm, Patient Position: Sitting, BP Cuff Size: Small adult)   Pulse 73   Temp 35.8 °C (96.4 °F) (Temporal)   Ht 1.651 m (5' 5\")   Wt 73.2 kg (161 lb 6.4 oz)   SpO2 99%   BMI 26.86 kg/m²        PREVIOUS WEIGHTS:  Wt Readings from Last 3 Encounters:   08/26/24 73.2 kg (161 lb 6.4 oz)   07/09/24 72.1 kg (159 lb)   04/29/24 74.8 kg (165 lb)       Constitutional: Alert and oriented, cooperative, no acute distress  Head: Normocephalic, atraumatic   Cranial Features: No abnormal craniofacial features  Neck: Supple. Trachea midline.  Pulmonary: Non-labored breathing, speaks in full sentences. No cough.    Cardiac: regular rate   Extremities: No clubbing, no edema  Neuromuscular: Cranial nerves grossly intact, no focal deficits      RESULTS/DATA     Bicarbonate (mmol/L)   Date Value   04/09/2024 29   11/13/2023 27   09/12/2022 28   09/08/2021 27   09/09/2020 25       PAP Adherence  Not " applicable    ASSESSMENT/PLAN     Ms. Wolf is a 54 y.o. female and she returns in followup to the [facility] for BHAKTI.    Problem List, Orders, Assessment, Recommendations:  Problem List Items Addressed This Visit             ICD-10-CM    Sleep apnea G47.30     -mild BHAKTI, fairly positional  -was doing ok with positional therapy  -would like something more definitive for snoring/being able to sleep on back  -Diet, exercise, and weight loss were emphasized today in clinic, as were non-supine sleep, avoiding alcohol in the late evening, and driving or operating heavy machinery when sleepy.  -successfully lost ~15lbs and has been maintaining weight loss s           Relevant Orders    Positive Airway Pressure (PAP) Therapy       Disposition    Return to clinic in 4 months  -with benedicto quipment

## 2024-08-26 NOTE — PATIENT INSTRUCTIONS
[facility] Sleep Medicine  DO [address]  [address] [address]       NAME: Margareth Wolf   DATE: 08/26/24    Your Sleep Provider Today: Dorene Yuen PA-C  Your Primary Care Physician: Belinda Asencio, APRN-CNP   Your Referring Provider: Dorene Yuen PA-C    DIAGNOSIS:   1. Sleep apnea, unspecified type  Follow Up In Adult Sleep Medicine          Thank you for coming to the Sleep Medicine Clinic today! Your sleep medicine provider today was: Dorene Yuen PA-C Below is a summary of your treatment plan, other important information, and our contact numbers:      TREATMENT PLAN   FOR QUESTIONS AND CONCERNS:   1. In case of difficulties with PAP device or mask interface, please FIRST contact your DME        (If using [employer]: [phone])  2. For questions concerning SLEEP CLINIC appointments, please call [phone].  3. Regarding SLEEP LAB scheduling, please call [phone] or [phone]    Obstructive Sleep Apnea (BHAKTI) is a disorder characterized by recurrent apneas during sleep despite efforts to breath. It is due to upper airway obstruction. These respiratory pauses may induce high levels of carbon dioxide or low oxygen levels. Cardiac arrhythmia and elevation of blood pressure in the body and the lungs may occur. Frequent partial arousals occur during a nights sleep resulting in sleep deprivation and daytime sleepiness. It has been associated with an increased risk of cardiovascular disease, stroke, hypertension, and insulin resistance.    RECOMMENDATIONS to help your sleep apnea include: losing weight if overweight, avoidance of sleeping on your back, avoidance of alcohol 2-3 hours before be      As we discussed, you have sleep apnea. We will order an CPAP for you which will set you up with your new PAP at home. This will be done by a [employer] (DME).    **Bring all equipment with you to follow-up  "appointments.**     **You will need to be seen day 31-90 days after CPAP set up.**    Insurance expects 4+ hours of use at least 70% of the time.         *Additional Tips*  *You can look at CPAP.com to view different mask styles  - You may be able to swap masks with your PAP vendor within 30 days without being charged for a new mask from the time you receive your PAP device. You should take the advantage of this offer if you have a hard time finding the right mask, as there are many mask options. Please do not get discouraged if you do not like the first one!  - You must clean your PAP device regularly per instructions from your PAP vendor.    *Common Issues and Solutions*  Pillow is dislodging mask - Consider getting a CPAP pillow or switching to a mask with the hose at the top.    Dry Mouth - Adjust Humidity and/or try Biotene Gel.    Leak - Please call your equipment provider (i.e. [employer]) as they may need to adjust your mask.    Difficulty tolerating the PAP mask - Contact your equipment company to try a different mask, we can order a \"mini sleep study\" with the sleep tech to try different masks/settings of pressure.      Additional Resources: American Academy of Sleep Medicine http://sleepeducation.org; or National Sleep Foundation: https://sleepfoundation.org      Follow-up Appointment:   4 months       IMPORTANT INFORMATION     Call 911 for medical emergencies.  Our offices are generally open from Monday-Friday, 9 am - 5 pm.  If you need to get in touch with me, you may either call me and my team(number is below) or you can use [employer].  If a referral for a test, for CPAP, or for another specialist was made, and you have not heard about scheduling this within a week, please call scheduling at [phone] ([phone]).  If you are unable to make your appointment for clinic or an overnight study, kindly call the office at least 48 hours in advance to cancel and reschedule.  If you are on CPAP, " please bring your device's card or the device to each clinic appointment.   There are no supporting services by either the sleep doctors or their staff on weekends and Holidays, or after 5 PM on weekdays.   If you have been asked to come to a sleep study, make sure you bring toiletries, a comfy pillow, and any nighttime medications that you may regularly take. Also be sure to eat dinner before you arrive. We generally do not provide meals.      PRESCRIPTIONS     We require 7 days advanced notice for prescription refills. If we do not receive the request in this time, we cannot guarantee that your medication will be refilled in time.      IMPORTANT PHONE NUMBERS     Sleep Medicine Clinic Fax: [phone]  Appointments (for Adult Sleep Clinic): [phone] - option 2  Appointments (For Sleep Studies): [phone] - option 3  Behavioral Sleep Medicine: [phone]  Sleep Surgery: [phone]  ENT (Otolaryngology): [phone]  Headache Clinic (Neurology): [phone]  Neurology: [phone]  Psychiatry: [phone]  Pulmonary Function Testing (PFT) Center: [phone]  Pulmonary Medicine: [phone]  [employer] (DME): [phone]  [employer] (DME): [phone]  [facility] ([facility]): [phone]      OUR ADULT SLEEP MEDICINE TEAM   Please do not hesitate to call the office or sleep nurse with any questions between appointments:    Adult Sleep Nurses (Preethi Menjivar RN and Tammy Canada RN):  For clinical questions and refilling prescriptions: [phone]  Email sleep diaries and other documents at: [email or web address]    Adult Sleep Medicine Secretaries:  Buffy Mckeon (For [place]ewendi/Rodrigues):   P: [phone]  F: [phone]  Angy Camacho (For [place]): P: [phone]  Fax: [phone]  Oumou Verma (For [place]/Blank): P: [phone]  F: [phone]  Laverne Silverman (For [place]): P: [phone]   "F: [phone]  Carey Ned (For Heidi/Waqas/Zakhary): P: [phone]  F: [phone]  Isa Nazario (For [place]): P: [phone]  F: [phone]     Adult Sleep Medicine Advanced Practice Providers:  Marcus Nunez ([place], [place])  Cheyenne Alan ([facility])  Ade Reed CNP ([place], [place], [place])  Christina Yuen CNP ([place], [place], [place])  Ana Kelley ([place], [place], [place])  Kathi Magallanes CNP ([place], [place])        OUR SLEEP TESTING LOCATIONS     Our team will contact you to schedule your sleep study, however, you can contact us as follow:  Main Phone Line (scheduling only): [phone]), option 3  Adult and Pediatric Locations  [facility] (6 years and older): [facility] - 4th floor ([facility] [facility]) After hours line: [phone]  [facility] (Main campus: All ages): [facility], 6th floor. After hours line: [phone]   [place] (5 years and older; younger considered on case-by-case basis): [address], [address]. Scheduling  After hours line: [phone]   [place] (6 years and older): [address]; [address]; [address]   [place] (6 years and older): [address] [facility] [address]  After hours line: [phone]   [place] (13 years and older) in [place]: [address], 2nd floor  After hours line: [phone]  [facility] (13 year and older): [address]  After hours line: [phone]     Adult Only Locations:   [place] (18 years and older): [address] [facility], 2nd floor   [place] (18 years and older): [address]; 4th floor  After hours line: [phone]   [place] (18 years and older) at [place]: [address] [place] [address]  After hours line: [phone]          CONTACTING YOUR SLEEP MEDICINE PROVIDER     Send a message directly to your provider through \"My Chart\", which is " "the email service through your  Records Account: [email or web address]   Call [phone] and leave a message. One of the administrative assistants will forward the message to your sleep medicine provider through \"My Chart\" and/or email.     Your sleep medicine provider for this visit was: Dorene Yuen PA-C    "

## 2024-08-26 NOTE — ASSESSMENT & PLAN NOTE
-mild BHAKTI, fairly positional  -was doing ok with positional therapy  -would like something more definitive for snoring/being able to sleep on back  -Diet, exercise, and weight loss were emphasized today in clinic, as were non-supine sleep, avoiding alcohol in the late evening, and driving or operating heavy machinery when sleepy.  -successfully lost ~15lbs and has been maintaining weight loss s

## 2024-12-01 ASSESSMENT — PROMIS GLOBAL HEALTH SCALE
CARRYOUT_PHYSICAL_ACTIVITIES: COMPLETELY
RATE_PHYSICAL_HEALTH: VERY GOOD
RATE_QUALITY_OF_LIFE: EXCELLENT
RATE_AVERAGE_PAIN: 2
RATE_GENERAL_HEALTH: VERY GOOD
RATE_AVERAGE_FATIGUE: MILD
RATE_MENTAL_HEALTH: EXCELLENT
RATE_SOCIAL_SATISFACTION: EXCELLENT
CARRYOUT_SOCIAL_ACTIVITIES: EXCELLENT
EMOTIONAL_PROBLEMS: RARELY

## 2024-12-02 ENCOUNTER — LAB (OUTPATIENT)
Dept: LAB | Facility: LAB | Age: 54
End: 2024-12-02
Payer: COMMERCIAL

## 2024-12-02 ENCOUNTER — APPOINTMENT (OUTPATIENT)
Dept: PRIMARY CARE | Facility: CLINIC | Age: 54
End: 2024-12-02
Payer: COMMERCIAL

## 2024-12-02 VITALS
DIASTOLIC BLOOD PRESSURE: 87 MMHG | OXYGEN SATURATION: 95 % | HEIGHT: 66 IN | BODY MASS INDEX: 26.33 KG/M2 | WEIGHT: 163.8 LBS | HEART RATE: 90 BPM | TEMPERATURE: 97.8 F | SYSTOLIC BLOOD PRESSURE: 136 MMHG

## 2024-12-02 DIAGNOSIS — R00.0 TACHYCARDIA: ICD-10-CM

## 2024-12-02 DIAGNOSIS — Z00.00 HEALTHCARE MAINTENANCE: ICD-10-CM

## 2024-12-02 DIAGNOSIS — Z00.00 HEALTHCARE MAINTENANCE: Primary | ICD-10-CM

## 2024-12-02 LAB
ALBUMIN SERPL BCP-MCNC: 4.5 G/DL (ref 3.4–5)
ALP SERPL-CCNC: 61 U/L (ref 33–110)
ALT SERPL W P-5'-P-CCNC: 9 U/L (ref 7–45)
ANION GAP SERPL CALC-SCNC: 12 MMOL/L (ref 10–20)
AST SERPL W P-5'-P-CCNC: 14 U/L (ref 9–39)
BASOPHILS # BLD AUTO: 0.11 X10*3/UL (ref 0–0.1)
BASOPHILS NFR BLD AUTO: 1.3 %
BILIRUB SERPL-MCNC: 0.5 MG/DL (ref 0–1.2)
BUN SERPL-MCNC: 20 MG/DL (ref 6–23)
CALCIUM SERPL-MCNC: 9.8 MG/DL (ref 8.6–10.3)
CHLORIDE SERPL-SCNC: 105 MMOL/L (ref 98–107)
CHOLEST SERPL-MCNC: 238 MG/DL (ref 0–199)
CHOLESTEROL/HDL RATIO: 5
CO2 SERPL-SCNC: 28 MMOL/L (ref 21–32)
CREAT SERPL-MCNC: 0.89 MG/DL (ref 0.5–1.05)
EGFRCR SERPLBLD CKD-EPI 2021: 76 ML/MIN/1.73M*2
EOSINOPHIL # BLD AUTO: 0.14 X10*3/UL (ref 0–0.7)
EOSINOPHIL NFR BLD AUTO: 1.7 %
ERYTHROCYTE [DISTWIDTH] IN BLOOD BY AUTOMATED COUNT: 12.5 % (ref 11.5–14.5)
GLUCOSE SERPL-MCNC: 83 MG/DL (ref 74–99)
HCT VFR BLD AUTO: 41.2 % (ref 36–46)
HDLC SERPL-MCNC: 47.4 MG/DL
HGB BLD-MCNC: 13.6 G/DL (ref 12–16)
IMM GRANULOCYTES # BLD AUTO: 0.04 X10*3/UL (ref 0–0.7)
IMM GRANULOCYTES NFR BLD AUTO: 0.5 % (ref 0–0.9)
LDLC SERPL CALC-MCNC: 159 MG/DL
LYMPHOCYTES # BLD AUTO: 2.9 X10*3/UL (ref 1.2–4.8)
LYMPHOCYTES NFR BLD AUTO: 35 %
MCH RBC QN AUTO: 28.6 PG (ref 26–34)
MCHC RBC AUTO-ENTMCNC: 33 G/DL (ref 32–36)
MCV RBC AUTO: 87 FL (ref 80–100)
MONOCYTES # BLD AUTO: 0.52 X10*3/UL (ref 0.1–1)
MONOCYTES NFR BLD AUTO: 6.3 %
NEUTROPHILS # BLD AUTO: 4.57 X10*3/UL (ref 1.2–7.7)
NEUTROPHILS NFR BLD AUTO: 55.2 %
NON HDL CHOLESTEROL: 191 MG/DL (ref 0–149)
NRBC BLD-RTO: 0 /100 WBCS (ref 0–0)
PLATELET # BLD AUTO: 321 X10*3/UL (ref 150–450)
POTASSIUM SERPL-SCNC: 4.5 MMOL/L (ref 3.5–5.3)
PROT SERPL-MCNC: 7.1 G/DL (ref 6.4–8.2)
RBC # BLD AUTO: 4.75 X10*6/UL (ref 4–5.2)
SODIUM SERPL-SCNC: 140 MMOL/L (ref 136–145)
TRIGL SERPL-MCNC: 160 MG/DL (ref 0–149)
TSH SERPL-ACNC: 2.8 MIU/L (ref 0.44–3.98)
VLDL: 32 MG/DL (ref 0–40)
WBC # BLD AUTO: 8.3 X10*3/UL (ref 4.4–11.3)

## 2024-12-02 PROCEDURE — 80053 COMPREHEN METABOLIC PANEL: CPT

## 2024-12-02 PROCEDURE — 85025 COMPLETE CBC W/AUTO DIFF WBC: CPT

## 2024-12-02 PROCEDURE — 80061 LIPID PANEL: CPT

## 2024-12-02 PROCEDURE — 3008F BODY MASS INDEX DOCD: CPT | Performed by: NURSE PRACTITIONER

## 2024-12-02 PROCEDURE — 84443 ASSAY THYROID STIM HORMONE: CPT

## 2024-12-02 PROCEDURE — 36415 COLL VENOUS BLD VENIPUNCTURE: CPT

## 2024-12-02 PROCEDURE — 99396 PREV VISIT EST AGE 40-64: CPT | Performed by: NURSE PRACTITIONER

## 2024-12-02 PROCEDURE — 1036F TOBACCO NON-USER: CPT | Performed by: NURSE PRACTITIONER

## 2024-12-02 ASSESSMENT — ENCOUNTER SYMPTOMS
CONSTITUTIONAL NEGATIVE: 1
NEUROLOGICAL NEGATIVE: 1
EYES NEGATIVE: 1
PSYCHIATRIC NEGATIVE: 1
ENDOCRINE NEGATIVE: 1
GASTROINTESTINAL NEGATIVE: 1
HEMATOLOGIC/LYMPHATIC NEGATIVE: 1
RESPIRATORY NEGATIVE: 1
CARDIOVASCULAR NEGATIVE: 1
MUSCULOSKELETAL NEGATIVE: 1

## 2024-12-02 ASSESSMENT — PATIENT HEALTH QUESTIONNAIRE - PHQ9
2. FEELING DOWN, DEPRESSED OR HOPELESS: NOT AT ALL
SUM OF ALL RESPONSES TO PHQ9 QUESTIONS 1 & 2: 0
1. LITTLE INTEREST OR PLEASURE IN DOING THINGS: NOT AT ALL

## 2024-12-02 NOTE — PROGRESS NOTES
"Subjective   Patient ID: Margareth Wolf is a 54 y.o. female who presents for Annual Exam and Flu Vaccine (Already received).    HPI   Presents today with Health Maintenance  Overall health is good.  Dental exams every 6 month.  Brushes and flosses twice daily.   Skin check annually.   Eye exams yearly.  Vision good.   No hearing loss  Non smoker, alcohol 0-1 per week, no drug use.   Diet well balanced.  Caffeine 2 cups per day.   Exerecise  Moves throughout the day.  No regular routine.   Blood work 11/2023 and 4 2024  Mammogram 11/2023 normal.   See gyn for pap  all normal  Colonoscocpy 2023 polyp 5 year recheck  Immunizations as noted.   Is still having periods of rapid heart rate.  Can go above 100 at times.   Would like to be evaluated by cardiology.  EKG, note and blood work reviewed form 4/9/2024  Review of Systems   Constitutional: Negative.    HENT: Negative.     Eyes: Negative.    Respiratory: Negative.     Cardiovascular: Negative.    Gastrointestinal: Negative.    Endocrine: Negative.    Genitourinary: Negative.    Musculoskeletal: Negative.    Skin: Negative.    Neurological: Negative.    Hematological: Negative.    Psychiatric/Behavioral: Negative.         Objective   /87 (BP Location: Right arm, Patient Position: Sitting, BP Cuff Size: Adult)   Pulse 90   Temp 36.6 °C (97.8 °F) (Temporal)   Ht 1.676 m (5' 6\")   Wt 74.3 kg (163 lb 12.8 oz)   SpO2 95%   BMI 26.44 kg/m²     Physical Exam  Constitutional:       Appearance: Normal appearance.   HENT:      Right Ear: Tympanic membrane, ear canal and external ear normal.      Left Ear: Tympanic membrane, ear canal and external ear normal.      Nose: Nose normal.      Mouth/Throat:      Mouth: Mucous membranes are moist.      Pharynx: Oropharynx is clear.   Eyes:      Pupils: Pupils are equal, round, and reactive to light.   Cardiovascular:      Rate and Rhythm: Normal rate and regular rhythm.      Pulses: Normal pulses.      Heart sounds: Normal " heart sounds.   Pulmonary:      Effort: Pulmonary effort is normal.      Breath sounds: Normal breath sounds.   Abdominal:      General: Abdomen is flat. Bowel sounds are normal.      Palpations: Abdomen is soft.   Musculoskeletal:         General: Normal range of motion.      Cervical back: Normal range of motion.   Lymphadenopathy:      Cervical: No cervical adenopathy.   Skin:     General: Skin is warm.   Neurological:      General: No focal deficit present.      Mental Status: She is alert and oriented to person, place, and time.   Psychiatric:         Mood and Affect: Mood normal.         Behavior: Behavior normal.         Assessment/Plan   Problem List Items Addressed This Visit    None  Visit Diagnoses         Codes    Healthcare maintenance    -  Primary Z00.00    Relevant Orders    CBC and Auto Differential    Comprehensive Metabolic Panel    Lipid Panel    Thyroid Stimulating Hormone    Tachycardia     R00.0    Relevant Orders    Referral to Cardiology

## 2024-12-02 NOTE — PATIENT INSTRUCTIONS
I will follow up with the blood work.   Medications renewed.   Follow up in 1 year and as needed   I have referred you to cardiology for work up your intermittent rapid heart rate

## 2024-12-16 ENCOUNTER — APPOINTMENT (OUTPATIENT)
Dept: SLEEP MEDICINE | Facility: CLINIC | Age: 54
End: 2024-12-16
Payer: COMMERCIAL

## 2025-01-03 PROBLEM — B96.89 ACUTE BACTERIAL BRONCHITIS: Status: ACTIVE | Noted: 2025-01-03

## 2025-01-03 PROBLEM — M54.50 ACUTE LOW BACK PAIN: Status: ACTIVE | Noted: 2025-01-03

## 2025-01-03 PROBLEM — M25.519 SHOULDER PAIN: Status: ACTIVE | Noted: 2025-01-03

## 2025-01-03 PROBLEM — M25.9 DISORDER OF ANKLE: Status: ACTIVE | Noted: 2025-01-03

## 2025-01-03 PROBLEM — R59.0 CERVICAL LYMPHADENOPATHY: Status: ACTIVE | Noted: 2025-01-03

## 2025-01-03 PROBLEM — J20.8 ACUTE BACTERIAL BRONCHITIS: Status: ACTIVE | Noted: 2025-01-03

## 2025-01-03 RX ORDER — POLYETHYLENE GLYCOL 3350, SODIUM CHLORIDE, SODIUM BICARBONATE, POTASSIUM CHLORIDE 420; 11.2; 5.72; 1.48 G/4L; G/4L; G/4L; G/4L
4000 POWDER, FOR SOLUTION ORAL ONCE
COMMUNITY
Start: 2023-05-16 | End: 2025-01-09 | Stop reason: ALTCHOICE

## 2025-01-06 NOTE — PROGRESS NOTES
Referred by Dr. Asencio for Tachycardia    Counseling:  The patient was counseled regarding diagnostic results, instructions for management, risk factor reductions, prognosis, patient and family education, impressions, risks and benefits of treatment options and importance of compliance with treatment.       History Of Present Illness:    Margareth Wolf is a 54 y.o. female patient whose PMH is significant for renal insufficiency, migraines and sleep apnea. She presents today to establish cardiovascular care for the evaluation and management of tachycardia. The patient reports an approximately 6-month history of occasional tachycardia with rates in the  range with associated mild lightheadedness. She denies any syncope. She also denies any CP, chest discomfort or SOB either at rest or with exertion. She has a history of migraines; on sumatriptan. The patient's family history is positive for heart disease in her paternal grandfather.     Past Surgical History:  She has no past surgical history on file.      Social History:  She reports that she has never smoked. She has never been exposed to tobacco smoke. She has never used smokeless tobacco. She reports current alcohol use of about 1.0 standard drink of alcohol per week. She reports that she does not use drugs.    Family History:  Family History   Problem Relation Name Age of Onset    Fibromyalgia Mother Mom     Breast cancer Mother Mom     Cancer Mother Mom     Diabetes Mother Mom     Prostate cancer Father          Allergies:  Clarithromycin    Outpatient Medications:  Current Outpatient Medications   Medication Instructions    BIOTIN ORAL oral    cholecalciferol (Vitamin D-3) 50 mcg (2,000 unit) capsule 1 tablet, oral    magnesium 250 mg tablet oral    polyethylene glycol-electrolytes (Nulytely) 420 gram solution 4,000 mL, Once    SUMAtriptan (IMITREX) 100 mg, oral, Once as needed        Last Recorded Vitals:  There were no vitals filed for this  visit.    Review of Systems   Cardiovascular:  Positive for palpitations.   All other systems reviewed and are negative.     Physical Exam:  Constitutional:       Appearance: Healthy appearance. Not in distress.   Neck:      Vascular: No JVR. JVD normal.   Pulmonary:      Effort: Pulmonary effort is normal.      Breath sounds: Normal breath sounds. No wheezing. No rhonchi. No rales.   Chest:      Chest wall: Not tender to palpatation.   Cardiovascular:      PMI at left midclavicular line. Normal rate. Regular rhythm. Normal S1. Normal S2.       Murmurs: There is no murmur.      No gallop.  No click. No rub.   Pulses:     Intact distal pulses.   Edema:     Peripheral edema absent.   Abdominal:      General: Bowel sounds are normal.      Palpations: Abdomen is soft.      Tenderness: There is no abdominal tenderness.   Musculoskeletal: Normal range of motion.         General: No tenderness. Skin:     General: Skin is warm and dry.   Neurological:      General: No focal deficit present.      Mental Status: Alert and oriented to person, place and time.            Last Labs:  CBC -  Lab Results   Component Value Date    WBC 8.3 12/02/2024    HGB 13.6 12/02/2024    HCT 41.2 12/02/2024    MCV 87 12/02/2024     12/02/2024       CMP -  Lab Results   Component Value Date    CALCIUM 9.8 12/02/2024    PROT 7.1 12/02/2024    ALBUMIN 4.5 12/02/2024    AST 14 12/02/2024    ALT 9 12/02/2024    ALKPHOS 61 12/02/2024    BILITOT 0.5 12/02/2024       LIPID PANEL -   Lab Results   Component Value Date    CHOL 238 (H) 12/02/2024    TRIG 160 (H) 12/02/2024    HDL 47.4 12/02/2024    CHHDL 5.0 12/02/2024    LDLF 165 (H) 09/12/2022    VLDL 32 12/02/2024    NHDL 191 (H) 12/02/2024       RENAL FUNCTION PANEL -   Lab Results   Component Value Date    GLUCOSE 83 12/02/2024     12/02/2024    K 4.5 12/02/2024     12/02/2024    CO2 28 12/02/2024    ANIONGAP 12 12/02/2024    BUN 20 12/02/2024    CREATININE 0.89 12/02/2024     CALCIUM 9.8 12/02/2024    ALBUMIN 4.5 12/02/2024        Last Cardiology Tests:  07/12/2024 - CT Calcium Score  1. Total 0.  2. The visualized mid/lower ascending thoracic aorta measures 2.9 cm in diameter. The heart is normal in size. No pericardial effusion is present.  3. No gross evidence of mediastinal or hilar lymphadenopathy or masses is identified. The visualized segments of the lungs are normally expanded.  4. The visualized subdiaphragmatic structures appear intact.    Lab review: I have personally reviewed the laboratory result(s).    Assessment/Plan   1) Tachycardia  TSH 12/02/2024 WNL at 2.80  6-month h/o occasional tachycardia with associated mild lightheadedness - rate in  range  Denies syncope  Denies CP, chest discomfort or SOB either at rest or with exertion   Personal h/o migraines - on sumatriptan  FH positive for heart disease in paternal grandfather  Check 14-day Holter  Check echo  F/U after testing     2) Elevated Lipids  CT calcium score 07/12/2024 with total score of 0  Lipid panel 12/02/2024 with total cholesterol, LDL and triglycerides of 238, 159 and 160 respectively  Not currently on statin therapy   Aggressive secondary risk factor modification       Scribe Attestation  By signing my name below, IBri Scribe   attest that this documentation has been prepared under the direction and in the presence of Porfirio Lundy MD.

## 2025-01-09 ENCOUNTER — OFFICE VISIT (OUTPATIENT)
Dept: CARDIOLOGY | Facility: HOSPITAL | Age: 55
End: 2025-01-09
Payer: COMMERCIAL

## 2025-01-09 ENCOUNTER — ANCILLARY PROCEDURE (OUTPATIENT)
Dept: CARDIOLOGY | Facility: HOSPITAL | Age: 55
End: 2025-01-09
Payer: COMMERCIAL

## 2025-01-09 VITALS
HEIGHT: 65 IN | SYSTOLIC BLOOD PRESSURE: 128 MMHG | HEART RATE: 82 BPM | WEIGHT: 162 LBS | BODY MASS INDEX: 26.99 KG/M2 | DIASTOLIC BLOOD PRESSURE: 84 MMHG

## 2025-01-09 DIAGNOSIS — R00.0 TACHYCARDIA: ICD-10-CM

## 2025-01-09 DIAGNOSIS — R00.2 PALPITATIONS: Primary | ICD-10-CM

## 2025-01-09 DIAGNOSIS — R00.2 PALPITATIONS: ICD-10-CM

## 2025-01-09 LAB
ATRIAL RATE: 82 BPM
BODY SURFACE AREA: 1.84 M2
P AXIS: 75 DEGREES
P OFFSET: 211 MS
P ONSET: 156 MS
PR INTERVAL: 136 MS
Q ONSET: 224 MS
QRS COUNT: 13 BEATS
QRS DURATION: 86 MS
QT INTERVAL: 344 MS
QTC CALCULATION(BAZETT): 401 MS
QTC FREDERICIA: 381 MS
R AXIS: 81 DEGREES
T AXIS: 70 DEGREES
T OFFSET: 396 MS
VENTRICULAR RATE: 82 BPM

## 2025-01-09 PROCEDURE — 1036F TOBACCO NON-USER: CPT | Performed by: INTERNAL MEDICINE

## 2025-01-09 PROCEDURE — 99203 OFFICE O/P NEW LOW 30 MIN: CPT | Performed by: INTERNAL MEDICINE

## 2025-01-09 PROCEDURE — 93005 ELECTROCARDIOGRAM TRACING: CPT | Performed by: INTERNAL MEDICINE

## 2025-01-09 PROCEDURE — 99213 OFFICE O/P EST LOW 20 MIN: CPT | Mod: 25 | Performed by: INTERNAL MEDICINE

## 2025-01-09 PROCEDURE — 3008F BODY MASS INDEX DOCD: CPT | Performed by: INTERNAL MEDICINE

## 2025-01-09 PROCEDURE — 93247 EXT ECG>7D<15D SCAN A/R: CPT

## 2025-01-09 ASSESSMENT — ENCOUNTER SYMPTOMS
OCCASIONAL FEELINGS OF UNSTEADINESS: 0
DEPRESSION: 0
PALPITATIONS: 1
LOSS OF SENSATION IN FEET: 0

## 2025-01-09 NOTE — PROGRESS NOTES
Zahida LEAVITT placed holter on pt on 1/9/25.    Pt was told what she can and cannot do while wearing the monitor.    Pt verbalized understanding.    Jessenia ANG

## 2025-01-09 NOTE — PATIENT INSTRUCTIONS
Dr. Lundy has ordered a heart monitor to assess your heart rhythm.  Dr. Lundy has also ordered an echocardiogram (ultrasound of the heart) to evaluate your heart function and structure.  Followup with Dr. Lundy after the above tests.    If you have any questions or cardiac concerns, please call our office at [phone].

## 2025-01-09 NOTE — LETTER
January 9, 2025     Belinda Asencio, APRN-CNP  [address]  [facility], [address]  [facility] [address]    Patient: Margareth Wolf   YOB: 1970   Date of Visit: 1/9/2025       Dear Dr. Belinda Asencio, APRN-CNP:    Thank you for referring Margareth Wolf to me for evaluation. Below are my notes for this consultation.  If you have questions, please do not hesitate to call me. I look forward to following your patient along with you.       Sincerely,     Porfirio Lundy MD      CC: No Recipients  ______________________________________________________________________________________    Referred by Dr. Asencio for Tachycardia    Counseling:  The patient was counseled regarding diagnostic results, instructions for management, risk factor reductions, prognosis, patient and family education, impressions, risks and benefits of treatment options and importance of compliance with treatment.       History Of Present Illness:    Margareth Wolf is a 54 y.o. female patient whose PMH is significant for renal insufficiency, migraines and sleep apnea. She presents today to establish cardiovascular care for the evaluation and management of tachycardia. The patient reports an approximately 6-month history of occasional tachycardia with rates in the  range with associated mild lightheadedness. She denies any syncope. She also denies any CP, chest discomfort or SOB either at rest or with exertion. She has a history of migraines; on sumatriptan. The patient's family history is positive for heart disease in her paternal grandfather.     Past Surgical History:  She has no past surgical history on file.      Social History:  She reports that she has never smoked. She has never been exposed to tobacco smoke. She has never used smokeless tobacco. She reports current alcohol use of about 1.0 standard drink of alcohol per week. She reports that she does not use drugs.    Family History:  Family History   Problem Relation  Name Age of Onset   • Fibromyalgia Mother Mom    • Breast cancer Mother Mom    • Cancer Mother Mom    • Diabetes Mother Mom    • Prostate cancer Father          Allergies:  Clarithromycin    Outpatient Medications:  Current Outpatient Medications   Medication Instructions   • BIOTIN ORAL oral   • cholecalciferol (Vitamin D-3) 50 mcg (2,000 unit) capsule 1 tablet, oral   • magnesium 250 mg tablet oral   • polyethylene glycol-electrolytes (Nulytely) 420 gram solution 4,000 mL, Once   • SUMAtriptan (IMITREX) 100 mg, oral, Once as needed        Last Recorded Vitals:  There were no vitals filed for this visit.    Review of Systems   Cardiovascular:  Positive for palpitations.   All other systems reviewed and are negative.     Physical Exam:  Constitutional:       Appearance: Healthy appearance. Not in distress.   Neck:      Vascular: No JVR. JVD normal.   Pulmonary:      Effort: Pulmonary effort is normal.      Breath sounds: Normal breath sounds. No wheezing. No rhonchi. No rales.   Chest:      Chest wall: Not tender to palpatation.   Cardiovascular:      PMI at left midclavicular line. Normal rate. Regular rhythm. Normal S1. Normal S2.       Murmurs: There is no murmur.      No gallop.  No click. No rub.   Pulses:     Intact distal pulses.   Edema:     Peripheral edema absent.   Abdominal:      General: Bowel sounds are normal.      Palpations: Abdomen is soft.      Tenderness: There is no abdominal tenderness.   Musculoskeletal: Normal range of motion.         General: No tenderness. Skin:     General: Skin is warm and dry.   Neurological:      General: No focal deficit present.      Mental Status: Alert and oriented to person, place and time.            Last Labs:  CBC -  Lab Results   Component Value Date    WBC 8.3 12/02/2024    HGB 13.6 12/02/2024    HCT 41.2 12/02/2024    MCV 87 12/02/2024     12/02/2024       CMP -  Lab Results   Component Value Date    CALCIUM 9.8 12/02/2024    PROT 7.1 12/02/2024     ALBUMIN 4.5 12/02/2024    AST 14 12/02/2024    ALT 9 12/02/2024    ALKPHOS 61 12/02/2024    BILITOT 0.5 12/02/2024       LIPID PANEL -   Lab Results   Component Value Date    CHOL 238 (H) 12/02/2024    TRIG 160 (H) 12/02/2024    HDL 47.4 12/02/2024    CHHDL 5.0 12/02/2024    LDLF 165 (H) 09/12/2022    VLDL 32 12/02/2024    NHDL 191 (H) 12/02/2024       RENAL FUNCTION PANEL -   Lab Results   Component Value Date    GLUCOSE 83 12/02/2024     12/02/2024    K 4.5 12/02/2024     12/02/2024    CO2 28 12/02/2024    ANIONGAP 12 12/02/2024    BUN 20 12/02/2024    CREATININE 0.89 12/02/2024    CALCIUM 9.8 12/02/2024    ALBUMIN 4.5 12/02/2024        Last Cardiology Tests:  07/12/2024 - CT Calcium Score  1. Total 0.  2. The visualized mid/lower ascending thoracic aorta measures 2.9 cm in diameter. The heart is normal in size. No pericardial effusion is present.  3. No gross evidence of mediastinal or hilar lymphadenopathy or masses is identified. The visualized segments of the lungs are normally expanded.  4. The visualized subdiaphragmatic structures appear intact.    Lab review: I have personally reviewed the laboratory result(s).    Assessment/Plan  1) Tachycardia  TSH 12/02/2024 WNL at 2.80  6-month h/o occasional tachycardia with associated mild lightheadedness - rate in  range  Denies syncope  Denies CP, chest discomfort or SOB either at rest or with exertion   Personal h/o migraines - on sumatriptan  FH positive for heart disease in paternal grandfather  Check 14-day Holter  Check echo  F/U after testing     2) Elevated Lipids  CT calcium score 07/12/2024 with total score of 0  Lipid panel 12/02/2024 with total cholesterol, LDL and triglycerides of 238, 159 and 160 respectively  Not currently on statin therapy   Aggressive secondary risk factor modification       Scribe Attestation  By signing my name below, IBri Scribe   attest that this documentation has been prepared under the  direction and in the presence of Porfirio Lundy MD.

## 2025-01-24 ENCOUNTER — HOSPITAL ENCOUNTER (OUTPATIENT)
Dept: CARDIOLOGY | Facility: HOSPITAL | Age: 55
Discharge: HOME | End: 2025-01-24
Payer: COMMERCIAL

## 2025-01-24 DIAGNOSIS — R00.2 PALPITATIONS: ICD-10-CM

## 2025-01-24 DIAGNOSIS — R00.0 TACHYCARDIA: ICD-10-CM

## 2025-01-24 LAB
AORTIC VALVE MEAN GRADIENT: 3 MMHG
AORTIC VALVE PEAK VELOCITY: 1.22 M/S
AV PEAK GRADIENT: 6 MMHG
AVA (PEAK VEL): 2.14 CM2
AVA (VTI): 2.06 CM2
EJECTION FRACTION APICAL 4 CHAMBER: 55.2
EJECTION FRACTION: 58 %
LEFT ATRIUM VOLUME AREA LENGTH INDEX BSA: 20.9 ML/M2
LEFT VENTRICLE INTERNAL DIMENSION DIASTOLE: 4.53 CM (ref 3.5–6)
LEFT VENTRICULAR OUTFLOW TRACT DIAMETER: 1.97 CM
LV EJECTION FRACTION BIPLANE: 53 %
MITRAL VALVE E/A RATIO: 0.89
RIGHT VENTRICLE FREE WALL PEAK S': 11.58 CM/S
RIGHT VENTRICLE PEAK SYSTOLIC PRESSURE: 30.4 MMHG
TRICUSPID ANNULAR PLANE SYSTOLIC EXCURSION: 2.3 CM

## 2025-01-24 PROCEDURE — 93306 TTE W/DOPPLER COMPLETE: CPT

## 2025-01-31 LAB — BODY SURFACE AREA: 1.84 M2

## 2025-01-31 PROCEDURE — 93248 EXT ECG>7D<15D REV&INTERPJ: CPT | Performed by: INTERNAL MEDICINE

## 2025-02-09 ASSESSMENT — ENCOUNTER SYMPTOMS: PALPITATIONS: 1

## 2025-02-09 NOTE — PROGRESS NOTES
Counseling:  The patient was counseled regarding diagnostic results, instructions for management, risk factor reductions, prognosis, patient and family education, impressions, risks and benefits of treatment options and importance of compliance with treatment.      Chief Complaint:  The patient presents today for 1-month followup of tachycardia s/p Holter monitor and echocardiogram.       History Of Present Illness:    Margareth Wolf is a 54 y.o. female patient who presents today for 1-month followup of tachycardia s/p Holter monitor and echocardiogram. Her PMH is significant for renal insufficiency, migraines and sleep apnea. Holter monitoring performed from 01/09/2025 to 01/23/2025 revealed 24 runs of SVT. TTE performed 01/24/2025 demonstrated an EF of 55-60%, normal RV systolic function and positive Bubble study. Today, the patient states that she is feeling well with no new cardiac complaints. She reports occasional palpitations while wearing the monitor.     Past Surgical History:  She has no past surgical history on file.      Social History:  She reports that she has never smoked. She has never been exposed to tobacco smoke. She has never used smokeless tobacco. She reports current alcohol use of about 1.0 standard drink of alcohol per week. She reports that she does not use drugs.    Family History:  Family History   Problem Relation Name Age of Onset    Fibromyalgia Mother Mom     Breast cancer Mother Mom     Cancer Mother Mom     Diabetes Mother Mom     Prostate cancer Father          Allergies:  Clarithromycin    Outpatient Medications:  Current Outpatient Medications   Medication Instructions    BIOTIN ORAL Take by mouth.    cholecalciferol (Vitamin D-3) 50 mcg (2,000 unit) capsule 1 tablet    magnesium 250 mg tablet Take by mouth.    SUMAtriptan (IMITREX) 100 mg, oral, Once as needed        Last Recorded Vitals:  Vitals:    02/10/25 1429   BP: 128/80   BP Location: Left arm   Pulse: 80   Weight: 73.5  "kg (162 lb)   Height: 1.651 m (5' 5\")     Review of Systems   Cardiovascular:  Positive for palpitations.   All other systems reviewed and are negative.     Physical Exam:  Constitutional:       Appearance: Healthy appearance. Not in distress.   Neck:      Vascular: No JVR. JVD normal.   Pulmonary:      Effort: Pulmonary effort is normal.      Breath sounds: Normal breath sounds. No wheezing. No rhonchi. No rales.   Chest:      Chest wall: Not tender to palpatation.   Cardiovascular:      PMI at left midclavicular line. Normal rate. Regular rhythm. Normal S1. Normal S2.       Murmurs: There is no murmur.      No gallop.  No click. No rub.   Pulses:     Intact distal pulses.   Edema:     Peripheral edema absent.   Abdominal:      General: Bowel sounds are normal.      Palpations: Abdomen is soft.      Tenderness: There is no abdominal tenderness.   Musculoskeletal: Normal range of motion.         General: No tenderness. Skin:     General: Skin is warm and dry.   Neurological:      General: No focal deficit present.      Mental Status: Alert and oriented to person, place and time.            Last Labs:  CBC -  Lab Results   Component Value Date    WBC 8.3 12/02/2024    HGB 13.6 12/02/2024    HCT 41.2 12/02/2024    MCV 87 12/02/2024     12/02/2024       CMP -  Lab Results   Component Value Date    CALCIUM 9.8 12/02/2024    PROT 7.1 12/02/2024    ALBUMIN 4.5 12/02/2024    AST 14 12/02/2024    ALT 9 12/02/2024    ALKPHOS 61 12/02/2024    BILITOT 0.5 12/02/2024       LIPID PANEL -   Lab Results   Component Value Date    CHOL 238 (H) 12/02/2024    TRIG 160 (H) 12/02/2024    HDL 47.4 12/02/2024    CHHDL 5.0 12/02/2024    LDLF 165 (H) 09/12/2022    VLDL 32 12/02/2024    NHDL 191 (H) 12/02/2024       RENAL FUNCTION PANEL -   Lab Results   Component Value Date    GLUCOSE 83 12/02/2024     12/02/2024    K 4.5 12/02/2024     12/02/2024    CO2 28 12/02/2024    ANIONGAP 12 12/02/2024    BUN 20 12/02/2024    " CREATININE 0.89 12/02/2024    CALCIUM 9.8 12/02/2024    ALBUMIN 4.5 12/02/2024        Last Cardiology Tests:  01/24/2025 - TTE  1. The left ventricular systolic function is normal, with a visually estimated ejection fraction of 55-60%.  2. There is normal right ventricular global systolic function.  3. A bubble study using agitated saline was performed. Bubble study is positive.    01/09/2025 to 01/23/2025 - Holter Monitor  1. Predominant underlying rhythm was sinus rhythm; min HR 50 bpm, max  bpm, avg HR 87 bpm.  2. 24 supraventricular tachycardia runs occurred; fastest interval lasting 11 beats with max rate 188 bpm, longest lasting 1 min 51 secs with avg rate 146 bpm.  3. Isolated SVEs were rare, SVE couplets were rare, and SVE triplets were rare.  4. Isolated VEs were rare, VE couplets were rare, and no VE triplets were present.     07/12/2024 - CT Calcium Score  1. Total 0.  2. The visualized mid/lower ascending thoracic aorta measures 2.9 cm in diameter. The heart is normal in size. No pericardial effusion is present.  3. No gross evidence of mediastinal or hilar lymphadenopathy or masses is identified. The visualized segments of the lungs are normally expanded.  4. The visualized subdiaphragmatic structures appear intact.    Diagnostic review: I have personally reviewed the result(s) of the Echocardiogram and Holter Monitor.     Assessment/Plan   1) Tachycardia - SVT  TSH 12/02/2024 WNL at 2.80  6-month h/o occasional tachycardia with associated mild lightheadedness - rate in  range  Denies syncope  Denies CP, chest discomfort or SOB either at rest or with exertion   Personal h/o migraines - on sumatriptan  FH positive for heart disease in paternal grandfather  Holter monitoring 01/09/2025 to 01/23/2025 with 24 runs of SVT  TTE 01/24/2025 with LVEF 55-60%, normal RV systolic function, positive Bubble study.  Reports occasional palpitations while wearing the monitor  Discussed medical management vs  ablation   Start metoprolol succinate 25 mg daily   F/U 3 months - if symptoms persist despite medical management, consider EP referral for ablation     2) Elevated Lipids  CT calcium score 07/12/2024 with total score of 0  Lipid panel 12/02/2024 with total cholesterol, LDL and triglycerides of 238, 159 and 160 respectively  Not currently on statin therapy   Aggressive secondary risk factor modification     3) PFO  TTE 01/24/2025 with positive Bubble study  20-30 year h/o migraines - well managed at this time   Educated on the pathophysiology of PFO  Avoid deep sea diving   Start ASA 81 mg daily       Scribe Attestation  By signing my name below, I, Delores Maxwell   attest that this documentation has been prepared under the direction and in the presence of Porfirio Lundy MD.

## 2025-02-10 ENCOUNTER — OFFICE VISIT (OUTPATIENT)
Dept: CARDIOLOGY | Facility: HOSPITAL | Age: 55
End: 2025-02-10
Payer: COMMERCIAL

## 2025-02-10 VITALS
SYSTOLIC BLOOD PRESSURE: 128 MMHG | BODY MASS INDEX: 26.99 KG/M2 | HEART RATE: 80 BPM | HEIGHT: 65 IN | WEIGHT: 162 LBS | DIASTOLIC BLOOD PRESSURE: 80 MMHG

## 2025-02-10 DIAGNOSIS — R00.2 PALPITATIONS: ICD-10-CM

## 2025-02-10 DIAGNOSIS — R00.0 TACHYCARDIA: ICD-10-CM

## 2025-02-10 DIAGNOSIS — I47.10 PAROXYSMAL SUPRAVENTRICULAR TACHYCARDIA (CMS-HCC): Primary | ICD-10-CM

## 2025-02-10 PROCEDURE — 3008F BODY MASS INDEX DOCD: CPT | Performed by: INTERNAL MEDICINE

## 2025-02-10 PROCEDURE — 99213 OFFICE O/P EST LOW 20 MIN: CPT | Performed by: INTERNAL MEDICINE

## 2025-02-10 PROCEDURE — 1036F TOBACCO NON-USER: CPT | Performed by: INTERNAL MEDICINE

## 2025-02-10 RX ORDER — ASPIRIN 81 MG/1
81 TABLET ORAL DAILY
Qty: 30 TABLET | Refills: 11 | Status: SHIPPED | OUTPATIENT
Start: 2025-02-10 | End: 2026-02-10

## 2025-02-10 RX ORDER — METOPROLOL SUCCINATE 25 MG/1
25 TABLET, EXTENDED RELEASE ORAL DAILY
Qty: 90 TABLET | Refills: 3 | Status: SHIPPED | OUTPATIENT
Start: 2025-02-10 | End: 2026-02-10

## 2025-02-10 ASSESSMENT — ENCOUNTER SYMPTOMS
DEPRESSION: 0
OCCASIONAL FEELINGS OF UNSTEADINESS: 0
LOSS OF SENSATION IN FEET: 0

## 2025-02-10 NOTE — PATIENT INSTRUCTIONS
Start a baby aspirin (81 mg) daily. A prescription has been sent to your pharmacy.    Start metoprolol succinate 25 mg daily. A prescription has been sent to your pharmacy.    Followup with Dr. Lundy in 3 months.    If you have any questions or cardiac concerns, please call our office at [phone].    19-Oct-2021 11:37

## 2025-02-10 NOTE — LETTER
February 10, 2025     Belinda Asencio, JEEVAN-CNP  [address]  [facility], [address]  [facility] [address]    Patient: Margareth Wolf   YOB: 1970   Date of Visit: 2/10/2025       Dear Dr. Belinda Asencio, JEEVAN-CNP:    Thank you for referring Margareth Wolf to me for evaluation. Below are my notes for this consultation.  If you have questions, please do not hesitate to call me. I look forward to following your patient along with you.       Sincerely,     Porfirio Lundy MD      CC: No Recipients  ______________________________________________________________________________________    Counseling:  The patient was counseled regarding diagnostic results, instructions for management, risk factor reductions, prognosis, patient and family education, impressions, risks and benefits of treatment options and importance of compliance with treatment.      Chief Complaint:  The patient presents today for 1-month followup of tachycardia s/p Holter monitor and echocardiogram.       History Of Present Illness:    Margareth Wolf is a 54 y.o. female patient who presents today for 1-month followup of tachycardia s/p Holter monitor and echocardiogram. Her PMH is significant for renal insufficiency, migraines and sleep apnea. Holter monitoring performed from 01/09/2025 to 01/23/2025 revealed 24 runs of SVT. TTE performed 01/24/2025 demonstrated an EF of 55-60%, normal RV systolic function and positive Bubble study. Today, the patient states that she is feeling well with no new cardiac complaints. She reports occasional palpitations while wearing the monitor.     Past Surgical History:  She has no past surgical history on file.      Social History:  She reports that she has never smoked. She has never been exposed to tobacco smoke. She has never used smokeless tobacco. She reports current alcohol use of about 1.0 standard drink of alcohol per week. She reports that she does not use drugs.    Family History:  Family  "History   Problem Relation Name Age of Onset   • Fibromyalgia Mother Mom    • Breast cancer Mother Mom    • Cancer Mother Mom    • Diabetes Mother Mom    • Prostate cancer Father          Allergies:  Clarithromycin    Outpatient Medications:  Current Outpatient Medications   Medication Instructions   • BIOTIN ORAL Take by mouth.   • cholecalciferol (Vitamin D-3) 50 mcg (2,000 unit) capsule 1 tablet   • magnesium 250 mg tablet Take by mouth.   • SUMAtriptan (IMITREX) 100 mg, oral, Once as needed        Last Recorded Vitals:  Vitals:    02/10/25 1429   BP: 128/80   BP Location: Left arm   Pulse: 80   Weight: 73.5 kg (162 lb)   Height: 1.651 m (5' 5\")     Review of Systems   Cardiovascular:  Positive for palpitations.   All other systems reviewed and are negative.     Physical Exam:  Constitutional:       Appearance: Healthy appearance. Not in distress.   Neck:      Vascular: No JVR. JVD normal.   Pulmonary:      Effort: Pulmonary effort is normal.      Breath sounds: Normal breath sounds. No wheezing. No rhonchi. No rales.   Chest:      Chest wall: Not tender to palpatation.   Cardiovascular:      PMI at left midclavicular line. Normal rate. Regular rhythm. Normal S1. Normal S2.       Murmurs: There is no murmur.      No gallop.  No click. No rub.   Pulses:     Intact distal pulses.   Edema:     Peripheral edema absent.   Abdominal:      General: Bowel sounds are normal.      Palpations: Abdomen is soft.      Tenderness: There is no abdominal tenderness.   Musculoskeletal: Normal range of motion.         General: No tenderness. Skin:     General: Skin is warm and dry.   Neurological:      General: No focal deficit present.      Mental Status: Alert and oriented to person, place and time.            Last Labs:  CBC -  Lab Results   Component Value Date    WBC 8.3 12/02/2024    HGB 13.6 12/02/2024    HCT 41.2 12/02/2024    MCV 87 12/02/2024     12/02/2024       CMP -  Lab Results   Component Value Date    CALCIUM " 9.8 12/02/2024    PROT 7.1 12/02/2024    ALBUMIN 4.5 12/02/2024    AST 14 12/02/2024    ALT 9 12/02/2024    ALKPHOS 61 12/02/2024    BILITOT 0.5 12/02/2024       LIPID PANEL -   Lab Results   Component Value Date    CHOL 238 (H) 12/02/2024    TRIG 160 (H) 12/02/2024    HDL 47.4 12/02/2024    CHHDL 5.0 12/02/2024    LDLF 165 (H) 09/12/2022    VLDL 32 12/02/2024    NHDL 191 (H) 12/02/2024       RENAL FUNCTION PANEL -   Lab Results   Component Value Date    GLUCOSE 83 12/02/2024     12/02/2024    K 4.5 12/02/2024     12/02/2024    CO2 28 12/02/2024    ANIONGAP 12 12/02/2024    BUN 20 12/02/2024    CREATININE 0.89 12/02/2024    CALCIUM 9.8 12/02/2024    ALBUMIN 4.5 12/02/2024        Last Cardiology Tests:  01/24/2025 - TTE  1. The left ventricular systolic function is normal, with a visually estimated ejection fraction of 55-60%.  2. There is normal right ventricular global systolic function.  3. A bubble study using agitated saline was performed. Bubble study is positive.    01/09/2025 to 01/23/2025 - Holter Monitor  1. Predominant underlying rhythm was sinus rhythm; min HR 50 bpm, max  bpm, avg HR 87 bpm.  2. 24 supraventricular tachycardia runs occurred; fastest interval lasting 11 beats with max rate 188 bpm, longest lasting 1 min 51 secs with avg rate 146 bpm.  3. Isolated SVEs were rare, SVE couplets were rare, and SVE triplets were rare.  4. Isolated VEs were rare, VE couplets were rare, and no VE triplets were present.     07/12/2024 - CT Calcium Score  1. Total 0.  2. The visualized mid/lower ascending thoracic aorta measures 2.9 cm in diameter. The heart is normal in size. No pericardial effusion is present.  3. No gross evidence of mediastinal or hilar lymphadenopathy or masses is identified. The visualized segments of the lungs are normally expanded.  4. The visualized subdiaphragmatic structures appear intact.    Diagnostic review: I have personally reviewed the result(s) of the  Echocardiogram and Holter Monitor.     Assessment/Plan  1) Tachycardia - SVT  TSH 12/02/2024 WNL at 2.80  6-month h/o occasional tachycardia with associated mild lightheadedness - rate in  range  Denies syncope  Denies CP, chest discomfort or SOB either at rest or with exertion   Personal h/o migraines - on sumatriptan  FH positive for heart disease in paternal grandfather  Holter monitoring 01/09/2025 to 01/23/2025 with 24 runs of SVT  TTE 01/24/2025 with LVEF 55-60%, normal RV systolic function, positive Bubble study.  Reports occasional palpitations while wearing the monitor  Discussed medical management vs ablation   Start metoprolol succinate 25 mg daily   F/U 3 months - if symptoms persist despite medical management, consider EP referral for ablation     2) Elevated Lipids  CT calcium score 07/12/2024 with total score of 0  Lipid panel 12/02/2024 with total cholesterol, LDL and triglycerides of 238, 159 and 160 respectively  Not currently on statin therapy   Aggressive secondary risk factor modification     3) PFO  TTE 01/24/2025 with positive Bubble study  20-30 year h/o migraines - well managed at this time   Educated on the pathophysiology of PFO  Avoid deep sea diving   Start ASA 81 mg daily       Scribe Attestation  By signing my name below, I, Delores Maxwell   attest that this documentation has been prepared under the direction and in the presence of Porfirio Lundy MD.

## 2025-02-25 ENCOUNTER — HOSPITAL ENCOUNTER (OUTPATIENT)
Dept: CARDIOLOGY | Facility: HOSPITAL | Age: 55
Discharge: HOME | End: 2025-02-25
Payer: COMMERCIAL

## 2025-02-25 LAB
ALBUMIN SERPL BCP-MCNC: 4.5 G/DL (ref 3.4–5)
ALP SERPL-CCNC: 66 U/L (ref 33–110)
ALT SERPL W P-5'-P-CCNC: 9 U/L (ref 7–45)
ANION GAP SERPL CALC-SCNC: 10 MMOL/L (ref 10–20)
APTT PPP: 31 SECONDS (ref 26–36)
AST SERPL W P-5'-P-CCNC: 14 U/L (ref 9–39)
BASOPHILS # BLD AUTO: 0.08 X10*3/UL (ref 0–0.1)
BASOPHILS NFR BLD AUTO: 0.7 %
BILIRUB SERPL-MCNC: 0.3 MG/DL (ref 0–1.2)
BUN SERPL-MCNC: 23 MG/DL (ref 6–23)
CALCIUM SERPL-MCNC: 9.3 MG/DL (ref 8.6–10.3)
CARDIAC TROPONIN I PNL SERPL HS: 3 NG/L (ref 0–13)
CHLORIDE SERPL-SCNC: 108 MMOL/L (ref 98–107)
CO2 SERPL-SCNC: 27 MMOL/L (ref 21–32)
CREAT SERPL-MCNC: 0.98 MG/DL (ref 0.5–1.05)
EGFRCR SERPLBLD CKD-EPI 2021: 67 ML/MIN/1.73M*2
EOSINOPHIL # BLD AUTO: 0.21 X10*3/UL (ref 0–0.7)
EOSINOPHIL NFR BLD AUTO: 1.8 %
ERYTHROCYTE [DISTWIDTH] IN BLOOD BY AUTOMATED COUNT: 12.2 % (ref 11.5–14.5)
GLUCOSE SERPL-MCNC: 108 MG/DL (ref 74–99)
HCT VFR BLD AUTO: 37.3 % (ref 36–46)
HGB BLD-MCNC: 12.7 G/DL (ref 12–16)
IMM GRANULOCYTES # BLD AUTO: 0.06 X10*3/UL (ref 0–0.7)
IMM GRANULOCYTES NFR BLD AUTO: 0.5 % (ref 0–0.9)
INR PPP: 0.9 (ref 0.9–1.1)
LYMPHOCYTES # BLD AUTO: 3.22 X10*3/UL (ref 1.2–4.8)
LYMPHOCYTES NFR BLD AUTO: 28.1 %
MCH RBC QN AUTO: 28.9 PG (ref 26–34)
MCHC RBC AUTO-ENTMCNC: 34 G/DL (ref 32–36)
MCV RBC AUTO: 85 FL (ref 80–100)
MONOCYTES # BLD AUTO: 0.55 X10*3/UL (ref 0.1–1)
MONOCYTES NFR BLD AUTO: 4.8 %
NEUTROPHILS # BLD AUTO: 7.35 X10*3/UL (ref 1.2–7.7)
NEUTROPHILS NFR BLD AUTO: 64.1 %
NRBC BLD-RTO: 0 /100 WBCS (ref 0–0)
PLATELET # BLD AUTO: 304 X10*3/UL (ref 150–450)
POTASSIUM SERPL-SCNC: 4 MMOL/L (ref 3.5–5.3)
PROT SERPL-MCNC: 7.3 G/DL (ref 6.4–8.2)
PROTHROMBIN TIME: 10 SECONDS (ref 9.8–12.4)
RBC # BLD AUTO: 4.39 X10*6/UL (ref 4–5.2)
SODIUM SERPL-SCNC: 141 MMOL/L (ref 136–145)
WBC # BLD AUTO: 11.5 X10*3/UL (ref 4.4–11.3)

## 2025-02-25 PROCEDURE — 80053 COMPREHEN METABOLIC PANEL: CPT | Performed by: EMERGENCY MEDICINE

## 2025-02-25 PROCEDURE — 36415 COLL VENOUS BLD VENIPUNCTURE: CPT | Performed by: EMERGENCY MEDICINE

## 2025-02-25 PROCEDURE — 85610 PROTHROMBIN TIME: CPT | Performed by: EMERGENCY MEDICINE

## 2025-02-25 PROCEDURE — 85730 THROMBOPLASTIN TIME PARTIAL: CPT | Performed by: EMERGENCY MEDICINE

## 2025-02-25 PROCEDURE — 93005 ELECTROCARDIOGRAM TRACING: CPT

## 2025-02-25 PROCEDURE — 85025 COMPLETE CBC W/AUTO DIFF WBC: CPT | Performed by: EMERGENCY MEDICINE

## 2025-02-25 PROCEDURE — 84484 ASSAY OF TROPONIN QUANT: CPT | Performed by: EMERGENCY MEDICINE

## 2025-02-25 PROCEDURE — 99285 EMERGENCY DEPT VISIT HI MDM: CPT | Performed by: INTERNAL MEDICINE

## 2025-02-25 ASSESSMENT — PAIN SCALES - GENERAL: PAINLEVEL_OUTOF10: 6

## 2025-02-25 ASSESSMENT — PAIN DESCRIPTION - DESCRIPTORS: DESCRIPTORS: TIGHTNESS

## 2025-02-25 ASSESSMENT — COLUMBIA-SUICIDE SEVERITY RATING SCALE - C-SSRS
6. HAVE YOU EVER DONE ANYTHING, STARTED TO DO ANYTHING, OR PREPARED TO DO ANYTHING TO END YOUR LIFE?: NO
1. IN THE PAST MONTH, HAVE YOU WISHED YOU WERE DEAD OR WISHED YOU COULD GO TO SLEEP AND NOT WAKE UP?: NO
2. HAVE YOU ACTUALLY HAD ANY THOUGHTS OF KILLING YOURSELF?: NO

## 2025-02-25 ASSESSMENT — PAIN - FUNCTIONAL ASSESSMENT: PAIN_FUNCTIONAL_ASSESSMENT: 0-10

## 2025-02-25 ASSESSMENT — PAIN DESCRIPTION - LOCATION: LOCATION: CHEST

## 2025-02-26 ENCOUNTER — APPOINTMENT (OUTPATIENT)
Dept: RADIOLOGY | Facility: HOSPITAL | Age: 55
End: 2025-02-26
Payer: COMMERCIAL

## 2025-02-26 ENCOUNTER — HOSPITAL ENCOUNTER (EMERGENCY)
Facility: HOSPITAL | Age: 55
Discharge: HOME | End: 2025-02-26
Attending: INTERNAL MEDICINE
Payer: COMMERCIAL

## 2025-02-26 VITALS
SYSTOLIC BLOOD PRESSURE: 143 MMHG | DIASTOLIC BLOOD PRESSURE: 90 MMHG | HEART RATE: 75 BPM | BODY MASS INDEX: 26.66 KG/M2 | RESPIRATION RATE: 20 BRPM | HEIGHT: 65 IN | WEIGHT: 160 LBS | OXYGEN SATURATION: 100 % | TEMPERATURE: 98.1 F

## 2025-02-26 DIAGNOSIS — R07.9 CHEST PAIN, UNSPECIFIED TYPE: Primary | ICD-10-CM

## 2025-02-26 LAB — CARDIAC TROPONIN I PNL SERPL HS: 3 NG/L (ref 0–13)

## 2025-02-26 PROCEDURE — 2550000001 HC RX 255 CONTRASTS: Performed by: EMERGENCY MEDICINE

## 2025-02-26 PROCEDURE — 84484 ASSAY OF TROPONIN QUANT: CPT | Performed by: EMERGENCY MEDICINE

## 2025-02-26 PROCEDURE — 36415 COLL VENOUS BLD VENIPUNCTURE: CPT | Performed by: EMERGENCY MEDICINE

## 2025-02-26 PROCEDURE — 71275 CT ANGIOGRAPHY CHEST: CPT

## 2025-02-26 PROCEDURE — 71275 CT ANGIOGRAPHY CHEST: CPT | Performed by: STUDENT IN AN ORGANIZED HEALTH CARE EDUCATION/TRAINING PROGRAM

## 2025-02-26 RX ADMIN — IOHEXOL 75 ML: 350 INJECTION, SOLUTION INTRAVENOUS at 00:02

## 2025-02-26 NOTE — ED TRIAGE NOTES
Patient comes in with the complaint of Chest pain, sharp pain between  shoulders, and SOB with dizziness on and off for a few days. Chest pain on and off for two-three days.

## 2025-02-26 NOTE — ED TRIAGE NOTES
TRIAGE NOTE   I saw the patient as the Clinician in Triage and performed a brief history and physical exam, established acuity, and ordered appropriate tests to develop basic plan of care. Patient will be seen by an [facility], resident and/or physician who will independently evaluate the patient. Please see subsequent provider notes for further details and disposition.     Brief HPI: In brief, Margareth Wolf is a 55 y.o. female that presents for chest pain x 3-4 days.  Positive pressure-like sensation radiating to the back.  No pleuritic pain.  No nausea vomiting.  No associated cough.  No ripping sensation of the back or focal neurofindings.  No PND, orthopnea, CHAVARRIA.    Focused Physical exam:   Heart RRR  Lungs CTAB  Abd soft non-tender  Ext: no swelling     Plan/MDM:   Iv labs ct pe EKG troponin x 2     Please see subsequent provider note for further details and disposition

## 2025-02-26 NOTE — ED PROVIDER NOTES
HPI     CC: Chest Pain     HPI: Margareth Wolf is a 55 y.o. female with a history of chronic palpitations, migraine, who presents with chest pain.  Symptoms started 3 days ago.  She describes a tight/sharp substernal pain radiating in between her shoulder blades with associated positional lightheadedness/dizziness.  It is constant, no aggravating factors.  She has been doing some snow shoveling of late but was recently in [place] for the past few days and pretty sedentary.  She denies cough, fevers or chills.  She has mild associated shortness of breath.  She does follow with Dr. Lundy for chronic palpitations, was started on a beta-blocker 2 weeks ago and thinks it may have caused some of her symptoms she she denies any leg pain or swelling.,  No pain on deep inspiration.      ROS: 10-point review of systems was performed and is otherwise negative except as noted in HPI.    Limitations to history: N/A    Independent Historians:  at bedside    External Records Reviewed: Outpatient notes in EMR    Past Medical History: Noncontributory except per HPI     Past Surgical History: Noncontributory except per HPI     Family History: Reviewed and noncontributory     Social History:  Denies tobacco. Denies ETOH. Denies illicit drugs.    Social Determinants Affecting Care: N/A    Allergies   Allergen Reactions    Clarithromycin Palpitations and Rash       Home Meds:   Current Outpatient Medications   Medication Instructions    aspirin 81 mg, oral, Daily    BIOTIN ORAL Take by mouth.    cholecalciferol (Vitamin D-3) 50 mcg (2,000 unit) capsule 1 tablet    magnesium 250 mg tablet Take by mouth.    metoprolol succinate XL (TOPROL-XL) 25 mg, oral, Daily, Do not crush or chew.    SUMAtriptan (IMITREX) 100 mg, oral, Once as needed        Physical Exam     ED Triage Vitals [02/25/25 2111]   Temperature Heart Rate Respirations BP   36.7 °C (98.1 °F) 82 18 146/87      Pulse Ox Temp Source Heart Rate Source Patient Position    100 % Oral Monitor --      BP Location FiO2 (%)     -- --               Physical Exam  Vitals and nursing note reviewed.     CONSTITUTIONAL: Well appearing, well nourished, in no acute distress.   HENMT: Head atraumatic. Airway patent. Nasal mucosa clear. Mouth with normal mucosa, clear oropharynx. Uvula midline. Neck supple.    EYES: Clear bilaterally, pupils equally round and reactive to light.   CARDIOVASCULAR: Normal rate, regular rhythm.  Heart sounds S1, S2.  No murmurs, rubs or gallops. Normal pulses. Capillary refill < 2 sec.   RESPIRATORY: No increased work of breathing. Breath sounds clear and equal bilaterally.  GASTROINTESTINAL: Abdomen soft, non-distended, non-tender. No rebound, no guarding. Normal bowel sounds. No palpable masses.  GENITOURINARY:  No CVA tenderness.  MUSCULOSKELETAL: Reproducible chest wall and bilateral back tenderness just medial to scapula. Spine appears normal, range of motion is not limited, no muscle or joint tenderness. No edema.   NEUROLOGICAL: Alert and oriented, no asymmetry, moving all extremities equally.  SKIN: Warm, dry and intact. No rash or notable lesions.  PSYCHIATRIC: Normal mood and affect.  HEME/LYMPH: No adenopathy or splenomegaly.    Diagnostic Results      ECG: ECGs read and interpreted by me. See ED Course, below, for interpretation.    Labs Reviewed   CBC WITH AUTO DIFFERENTIAL - Abnormal       Result Value    WBC 11.5 (*)     nRBC 0.0      RBC 4.39      Hemoglobin 12.7      Hematocrit 37.3      MCV 85      MCH 28.9      MCHC 34.0      RDW 12.2      Platelets 304      Neutrophils % 64.1      Immature Granulocytes %, Automated 0.5      Lymphocytes % 28.1      Monocytes % 4.8      Eosinophils % 1.8      Basophils % 0.7      Neutrophils Absolute 7.35      Immature Granulocytes Absolute, Automated 0.06      Lymphocytes Absolute 3.22      Monocytes Absolute 0.55      Eosinophils Absolute 0.21      Basophils Absolute 0.08     COMPREHENSIVE METABOLIC PANEL -  Abnormal    Glucose 108 (*)     Sodium 141      Potassium 4.0      Chloride 108 (*)     Bicarbonate 27      Anion Gap 10      Urea Nitrogen 23      Creatinine 0.98      eGFR 67      Calcium 9.3      Albumin 4.5      Alkaline Phosphatase 66      Total Protein 7.3      AST 14      Bilirubin, Total 0.3      ALT 9     PROTIME-INR - Normal    Protime 10.0      INR 0.9     APTT - Normal    aPTT 31      Narrative:     The APTT is no longer used for monitoring Unfractionated Heparin Therapy. For monitoring Heparin Therapy, use the Heparin Assay.   SERIAL TROPONIN-INITIAL - Normal    Troponin I, High Sensitivity 3      Narrative:     Less than 99th percentile of normal range cutoff-  Female and children under 18 years old <14 ng/L; Male <21 ng/L: Negative  Repeat testing should be performed if clinically indicated.     Female and children under 18 years old 14-50 ng/L; Male 21-50 ng/L:  Consistent with possible cardiac damage and possible increased clinical   risk. Serial measurements may help to assess extent of myocardial damage.     >50 ng/L: Consistent with cardiac damage, increased clinical risk and  myocardial infarction. Serial measurements may help assess extent of   myocardial damage.      NOTE: Children less than 1 year old may have higher baseline troponin   levels and results should be interpreted in conjunction with the overall   clinical context.     NOTE: Troponin I testing is performed using a different   testing methodology at [facility] than at other   [facility]. Direct result comparisons should only   be made within the same method.   SERIAL TROPONIN, 1 HOUR - Normal    Troponin I, High Sensitivity 3      Narrative:     Less than 99th percentile of normal range cutoff-  Female and children under 18 years old <14 ng/L; Male <21 ng/L: Negative  Repeat testing should be performed if clinically indicated.     Female and children under 18 years old 14-50 ng/L; Male 21-50 ng/L:  Consistent  with possible cardiac damage and possible increased clinical   risk. Serial measurements may help to assess extent of myocardial damage.     >50 ng/L: Consistent with cardiac damage, increased clinical risk and  myocardial infarction. Serial measurements may help assess extent of   myocardial damage.      NOTE: Children less than 1 year old may have higher baseline troponin   levels and results should be interpreted in conjunction with the overall   clinical context.     NOTE: Troponin I testing is performed using a different   testing methodology at [facility] than at other   [facility]. Direct result comparisons should only   be made within the same method.   TROPONIN SERIES- (INITIAL, 1 HR)    Narrative:     The following orders were created for panel order Troponin I Series, High Sensitivity (0, 1 HR).  Procedure                               Abnormality         Status                     ---------                               -----------         ------                     Troponin I, High Sensiti...[[id number]]  Normal              Final result               Troponin, High Sensitivi...[[id number]]  Normal              Final result                 Please view results for these tests on the individual orders.         CT angio chest for pulmonary embolism   Final Result   1.  No evidence of pulmonary emboli. No evidence of acute chest   pathology.   2. Incidental note made of chronic partially calcified 1.5 x 0.9 cm   splenic artery aneurysm. Consider IR follow-up consultation.             Signed by: Dereck Valadez 2/26/2025 12:54 AM   Dictation workstation:   [id number]                    Rodney Coma Scale Score: 15                  Procedure  Procedures    ED Course & MDM   Assessment/Plan:   Margareth Wolf is a 55 y.o. female with a history of chronic palpitations, migraine, who presents with chest pain radiating to the back for the past few days.  She she has also had some positional  lightheadedness after starting a beta-blocker.  Her pain is quite reproducible on palpation and in the setting of recent snow shoveling may be related to musculoskeletal pain.  Given the pain radiating to the back, however, and recent travel, with shortness of breath, there is also some suspicion for possible PE although no signs of DVT, no tachycardia or hypoxia.  Workup was initiated by triage provider with ECG, labs, and CT PE. See below for details of ED course and ultimate disposition.    Medications   iohexol (OMNIPaque) 350 mg iodine/mL solution 75 mL (75 mL intravenous Given 2/26/25 0002)        ED Course as of 02/27/25 0744   Wed Feb 26, 2025   0120 ECG read and interpreted by me.  Normal sinus rhythm, rate 78.  Normal axis.  Normal intervals.  No ST or T wave derangements. [CG]   0120 Labs are notable for CBC with mild leukocytosis 11.5, normal H&H, normal platelets, normal CMP, troponin, coags. [CG]   0120 CTPE 1.  No evidence of pulmonary emboli. No evidence of acute chest pathology.  2. Incidental note made of chronic partially calcified 1.5 x 0.9 cm splenic artery aneurysm. Consider IR follow-up consultation.   [CG]   0128 Repeat troponin negative [CG]   0155 Patient was reevaluated.  She was advised on alternating acetaminophen and ibuprofen for pain control.  She is declining pain medications at this time.  Patient was discharged home with anticipatory guidance and strict return precautions. She will call her cardiologist for further recommendations.  [CG]      ED Course User Index  [CG] Meka Tan MD         Diagnoses as of 02/27/25 0744   Chest pain, unspecified type       Disposition:   DISCHARGE.  The patient was discharged with both verbal and written anticipatory guidance and strict return precautions. Discharge diagnosis, instructions and plan were discussed and understood. I emphasized the importance of following up with their primary care provider within 24-48 hours and with any  referrals in the timeframe recommended. At the time of discharge the patient was comfortable and was in no apparent distress. Patient is aware of diagnostic uncertainty and was notified though testing is negative here, there is a very small chance that pathology may be missed.  The patient understands these risks and the patient/family understood to call 911 or return immediately to the emergency department if the symptoms worsen or if they have any additional concerns.      FOLLOW UP  Primary care provider in 1-2 days.      ED Prescriptions    None         Meka Tan MD  EM/IM/Peds    This note was dictated by speech recognition. Minor errors in transcription may be present.     Meka Tan MD  02/27/25 4108

## 2025-02-26 NOTE — DISCHARGE INSTRUCTIONS
You were seen today for chest pain.  Your evaluation was not concerning for an emergency at this time.  You can take acetaminophen (Tylenol) 650 mg every 6 hours and ibuprofen (Motrin) 600 mg every 6 hours, alternating, for pain control.  Please see the attached information sheet for information about your condition, how to care for your condition at home, and reasons to return to the emergency department. Take any prescriptions written today as prescribed. You should call your primary care provider within 24 hours to tell them about today's visit, including any new medications or medication changes, as he or she may want to see you in the office for further evaluation. If you do not have a primary care provider, call  [phone] for an appointment. We offer in-person office visits as well as virtual options. Please do not hesitate to call  [phone] or return to the emergency department with any new or unresolved concerns or symptoms. Thank you for choosing [facility] for your care.

## 2025-02-28 LAB
ATRIAL RATE: 78 BPM
P AXIS: 69 DEGREES
P OFFSET: 205 MS
P ONSET: 148 MS
PR INTERVAL: 148 MS
Q ONSET: 222 MS
QRS COUNT: 13 BEATS
QRS DURATION: 84 MS
QT INTERVAL: 374 MS
QTC CALCULATION(BAZETT): 426 MS
QTC FREDERICIA: 408 MS
R AXIS: 74 DEGREES
T AXIS: 57 DEGREES
T OFFSET: 409 MS
VENTRICULAR RATE: 78 BPM

## 2025-05-11 NOTE — PROGRESS NOTES
Counseling:  The patient was counseled regarding diagnostic results, instructions for management, risk factor reductions, prognosis, patient and family education, impressions, risks and benefits of treatment options and importance of compliance with treatment.      Chief Complaint:  The patient presents today for 3-month followup of SVT.      History Of Present Illness:    Margareth Wolf is a 55 y.o. female patient who presents today for 3-month followup of SVT. Her PMH is significant for renal insufficiency, migraines and sleep apnea. The patient states that in February she presented to the ED with an elevated heart rate, but workup was negative. Since that time, she reports occasional palpitations, but nothing as severe and erratic as in February. BP has been stable. The patient is compliant with her prescribed medications.     Past Surgical History:  She has no past surgical history on file.      Social History:  She reports that she has never smoked. She has never been exposed to tobacco smoke. She has never used smokeless tobacco. She reports current alcohol use of about 1.0 standard drink of alcohol per week. She reports that she does not use drugs.    Family History:  Family History   Problem Relation Name Age of Onset    Fibromyalgia Mother Mom     Breast cancer Mother Mom     Cancer Mother Mom     Diabetes Mother Mom     Prostate cancer Father          Allergies:  Clarithromycin    Outpatient Medications:  Current Outpatient Medications   Medication Instructions    aspirin 81 mg, oral, Daily    BIOTIN ORAL Take by mouth.    cholecalciferol (Vitamin D-3) 50 mcg (2,000 unit) capsule 1 tablet    magnesium 250 mg tablet Take by mouth.    metoprolol succinate XL (TOPROL-XL) 25 mg, oral, Daily, Do not crush or chew.    SUMAtriptan (IMITREX) 100 mg, oral, Once as needed        Last Recorded Vitals:  Vitals:    05/12/25 1159   BP: 112/68   BP Location: Right arm   Pulse: 75   Weight: 77.6 kg (171 lb)   Height:  "1.651 m (5' 5\")       Review of Systems   Cardiovascular:  Positive for palpitations.   All other systems reviewed and are negative.     Physical Exam:  Constitutional:       Appearance: Healthy appearance. Not in distress.   Neck:      Vascular: No JVR. JVD normal.   Pulmonary:      Effort: Pulmonary effort is normal.      Breath sounds: Normal breath sounds. No wheezing. No rhonchi. No rales.   Chest:      Chest wall: Not tender to palpatation.   Cardiovascular:      PMI at left midclavicular line. Normal rate. Regular rhythm. Normal S1. Normal S2.       Murmurs: There is no murmur.      No gallop.  No click. No rub.   Pulses:     Intact distal pulses.   Edema:     Peripheral edema absent.   Abdominal:      General: Bowel sounds are normal.      Palpations: Abdomen is soft.      Tenderness: There is no abdominal tenderness.   Musculoskeletal: Normal range of motion.         General: No tenderness. Skin:     General: Skin is warm and dry.   Neurological:      General: No focal deficit present.      Mental Status: Alert and oriented to person, place and time.            Last Labs:  CBC -  Lab Results   Component Value Date    WBC 11.5 (H) 02/25/2025    HGB 12.7 02/25/2025    HCT 37.3 02/25/2025    MCV 85 02/25/2025     02/25/2025       CMP -  Lab Results   Component Value Date    CALCIUM 9.3 02/25/2025    PROT 7.3 02/25/2025    ALBUMIN 4.5 02/25/2025    AST 14 02/25/2025    ALT 9 02/25/2025    ALKPHOS 66 02/25/2025    BILITOT 0.3 02/25/2025       LIPID PANEL -   Lab Results   Component Value Date    CHOL 238 (H) 12/02/2024    TRIG 160 (H) 12/02/2024    HDL 47.4 12/02/2024    CHHDL 5.0 12/02/2024    LDLF 165 (H) 09/12/2022    VLDL 32 12/02/2024    NHDL 191 (H) 12/02/2024       RENAL FUNCTION PANEL -   Lab Results   Component Value Date    GLUCOSE 108 (H) 02/25/2025     02/25/2025    K 4.0 02/25/2025     (H) 02/25/2025    CO2 27 02/25/2025    ANIONGAP 10 02/25/2025    BUN 23 02/25/2025    CREATININE " 0.98 02/25/2025    CALCIUM 9.3 02/25/2025    ALBUMIN 4.5 02/25/2025        Last Cardiology Tests:  01/24/2025 - TTE  1. The left ventricular systolic function is normal, with a visually estimated ejection fraction of 55-60%.  2. There is normal right ventricular global systolic function.  3. A bubble study using agitated saline was performed. Bubble study is positive.    01/09/2025 to 01/23/2025 - Holter Monitor  1. Predominant underlying rhythm was sinus rhythm; min HR 50 bpm, max  bpm, avg HR 87 bpm.  2. 24 supraventricular tachycardia runs occurred; fastest interval lasting 11 beats with max rate 188 bpm, longest lasting 1 min 51 secs with avg rate 146 bpm.  3. Isolated SVEs were rare, SVE couplets were rare, and SVE triplets were rare.  4. Isolated VEs were rare, VE couplets were rare, and no VE triplets were present.     07/12/2024 - CT Calcium Score  1. Total 0.  2. The visualized mid/lower ascending thoracic aorta measures 2.9 cm in diameter. The heart is normal in size. No pericardial effusion is present.  3. No gross evidence of mediastinal or hilar lymphadenopathy or masses is identified. The visualized segments of the lungs are normally expanded.  4. The visualized subdiaphragmatic structures appear intact.    Lab review: I have personally reviewed the laboratory result(s).     Assessment/Plan   1) Tachycardia - SVT  On metoprolol succinate 25 mg daily   Personal h/o migraines - on sumatriptan  FH positive for heart disease in paternal grandfather  Holter monitoring 01/09/2025 to 01/23/2025 with 24 runs of SVT  TTE 01/24/2025 with LVEF 55-60%, normal RV systolic function, positive Bubble study.  If symptoms persist despite medical management, consider EP referral for ablation   ED evaluation 02/2025 with elevated HR - workup negative   Reports occasional palpitations, but not as severe as in February  BP stable  Discussed SVT ablation if symptoms worsen - patient will contact our office if symptoms  worsen  Continue current medical Rx   Followup with Savita Wolff CNP, in 6 months    2) Elevated Lipids  CT calcium score 07/12/2024 with total score of 0  Lipid panel 12/02/2024 with total cholesterol, LDL and triglycerides of 238, 159 and 160 respectively  Not currently on statin therapy   Aggressive secondary risk factor modification   Followup with Savita Wolff CNP, in 6 months    3) PFO  On ASA 81 mg daily   TTE 01/24/2025 with positive Bubble study  20-30 year h/o migraines - well managed at this time   Educated on the pathophysiology of PFO  Avoid deep sea diving   Continue current medical Rx   Followup with Savita Wolff CNP, in 6 months      Scribe Attestation  By signing my name below, I, Delores Maxwell, attest that this documentation has been prepared under the direction and in the presence of Porfirio Lundy MD.

## 2025-05-12 ENCOUNTER — OFFICE VISIT (OUTPATIENT)
Dept: CARDIOLOGY | Facility: HOSPITAL | Age: 55
End: 2025-05-12
Payer: COMMERCIAL

## 2025-05-12 VITALS
DIASTOLIC BLOOD PRESSURE: 68 MMHG | HEART RATE: 75 BPM | HEIGHT: 65 IN | SYSTOLIC BLOOD PRESSURE: 112 MMHG | BODY MASS INDEX: 28.49 KG/M2 | WEIGHT: 171 LBS

## 2025-05-12 DIAGNOSIS — R00.2 PALPITATIONS: ICD-10-CM

## 2025-05-12 DIAGNOSIS — I47.10 PAROXYSMAL SUPRAVENTRICULAR TACHYCARDIA: ICD-10-CM

## 2025-05-12 DIAGNOSIS — R00.0 TACHYCARDIA: ICD-10-CM

## 2025-05-12 PROCEDURE — 99213 OFFICE O/P EST LOW 20 MIN: CPT | Performed by: INTERNAL MEDICINE

## 2025-05-12 PROCEDURE — 1036F TOBACCO NON-USER: CPT | Performed by: INTERNAL MEDICINE

## 2025-05-12 PROCEDURE — 3008F BODY MASS INDEX DOCD: CPT | Performed by: INTERNAL MEDICINE

## 2025-05-12 ASSESSMENT — ENCOUNTER SYMPTOMS
LOSS OF SENSATION IN FEET: 0
OCCASIONAL FEELINGS OF UNSTEADINESS: 0
PALPITATIONS: 1
DEPRESSION: 0

## 2025-05-12 NOTE — PATIENT INSTRUCTIONS
Continue all current medications as prescribed.  Please let our office know if your palpitations worsen in frequency and severity.  Followup with Savita Wolff CNP, in 6 months.     If you have any questions or cardiac concerns, please call our office at [phone].

## 2025-06-24 ENCOUNTER — TELEPHONE (OUTPATIENT)
Dept: PRIMARY CARE | Facility: CLINIC | Age: 55
End: 2025-06-24
Payer: COMMERCIAL

## 2025-06-24 DIAGNOSIS — G43.119 INTRACTABLE MIGRAINE WITH AURA WITHOUT STATUS MIGRAINOSUS: ICD-10-CM

## 2025-06-24 RX ORDER — SUMATRIPTAN SUCCINATE 100 MG/1
100 TABLET ORAL ONCE AS NEEDED
Qty: 9 TABLET | Refills: 3 | Status: SHIPPED | OUTPATIENT
Start: 2025-06-24 | End: 2026-06-24

## 2025-06-24 NOTE — TELEPHONE ENCOUNTER
Patient was seen in December, states was supposed to have a refill sent in for her Sumatriptan that was never sent in. Please send to [facility]   Wo consulted for rash dermatitis to face.    [facility] cannot see this patient because it 1 and it is not a wound to I am not a dermatologist.  I cannot diagnose problems.  I could not even order prescribed medicine.      Contacted RN.  She notified me they have already ordered ointment for face.  Nothing more needed from [facility] will sign off.

## 2025-07-07 ENCOUNTER — OFFICE VISIT (OUTPATIENT)
Dept: PRIMARY CARE | Facility: CLINIC | Age: 55
End: 2025-07-07
Payer: COMMERCIAL

## 2025-07-07 VITALS
DIASTOLIC BLOOD PRESSURE: 68 MMHG | TEMPERATURE: 97.5 F | OXYGEN SATURATION: 98 % | HEART RATE: 83 BPM | HEIGHT: 65 IN | SYSTOLIC BLOOD PRESSURE: 114 MMHG | BODY MASS INDEX: 28.06 KG/M2 | WEIGHT: 168.4 LBS

## 2025-07-07 DIAGNOSIS — R10.9 ACUTE RIGHT FLANK PAIN: Primary | ICD-10-CM

## 2025-07-07 LAB
POC APPEARANCE, URINE: CLEAR
POC BILIRUBIN, URINE: NEGATIVE
POC BLOOD, URINE: NEGATIVE
POC COLOR, URINE: NORMAL
POC GLUCOSE, URINE: NEGATIVE MG/DL
POC KETONES, URINE: NEGATIVE MG/DL
POC LEUKOCYTES, URINE: NEGATIVE
POC NITRITE,URINE: NEGATIVE
POC PH, URINE: 6 PH
POC PROTEIN, URINE: NEGATIVE MG/DL
POC SPECIFIC GRAVITY, URINE: <=1.005
POC UROBILINOGEN, URINE: 0.2 EU/DL

## 2025-07-07 PROCEDURE — 81002 URINALYSIS NONAUTO W/O SCOPE: CPT | Performed by: FAMILY MEDICINE

## 2025-07-07 PROCEDURE — 3008F BODY MASS INDEX DOCD: CPT | Performed by: FAMILY MEDICINE

## 2025-07-07 PROCEDURE — 1036F TOBACCO NON-USER: CPT | Performed by: FAMILY MEDICINE

## 2025-07-07 PROCEDURE — 99213 OFFICE O/P EST LOW 20 MIN: CPT | Performed by: FAMILY MEDICINE

## 2025-07-07 ASSESSMENT — ENCOUNTER SYMPTOMS
PARESIS: 0
COUGH: 0
ANAL BLEEDING: 0
APPETITE CHANGE: 0
BOWEL INCONTINENCE: 0
TINGLING: 0
FLANK PAIN: 1
LEG PAIN: 0
VOMITING: 0
ACTIVITY CHANGE: 0
NAUSEA: 0
WEIGHT LOSS: 0
SHORTNESS OF BREATH: 0
ABDOMINAL PAIN: 0
FATIGUE: 0
CONSTIPATION: 1
WEAKNESS: 0
DIARRHEA: 0
PARESTHESIAS: 0
BLOOD IN STOOL: 0
DYSURIA: 0
NUMBNESS: 0
PERIANAL NUMBNESS: 0
RECTAL PAIN: 0
FEVER: 0
HEADACHES: 0

## 2025-07-07 ASSESSMENT — PATIENT HEALTH QUESTIONNAIRE - PHQ9
1. LITTLE INTEREST OR PLEASURE IN DOING THINGS: NOT AT ALL
SUM OF ALL RESPONSES TO PHQ9 QUESTIONS 1 AND 2: 0
2. FEELING DOWN, DEPRESSED OR HOPELESS: NOT AT ALL

## 2025-07-07 NOTE — PROGRESS NOTES
"Subjective   Patient ID: Margareth Wolf is a 55 y.o. female who presents for Flank Pain (Started 7/3).    Flank Pain  Episode onset: 7/3. The problem occurs intermittently. The problem is unchanged. The quality of the pain is described as aching. The pain does not radiate. The pain is at a severity of 4/10. Pertinent negatives include no abdominal pain, bladder incontinence, bowel incontinence, chest pain, dysuria, fever, headaches, leg pain, numbness, paresis, paresthesias, pelvic pain, perianal numbness, tingling, weakness or weight loss. She has tried heat and home exercises for the symptoms. The treatment provided mild relief.        Review of Systems   Constitutional:  Negative for activity change, appetite change, fatigue, fever and weight loss.   Respiratory:  Negative for cough and shortness of breath.    Cardiovascular:  Negative for chest pain.   Gastrointestinal:  Positive for constipation (mild). Negative for abdominal pain, anal bleeding, blood in stool, bowel incontinence, diarrhea, nausea, rectal pain and vomiting.   Genitourinary:  Positive for flank pain. Negative for bladder incontinence, dysuria and pelvic pain.   Skin:  Negative for rash.   Neurological:  Negative for tingling, weakness, numbness, headaches and paresthesias.       Objective   /68 (BP Location: Left arm, Patient Position: Sitting, BP Cuff Size: Adult)   Pulse 83   Temp 36.4 °C (97.5 °F) (Temporal)   Ht 1.651 m (5' 5\")   Wt 76.4 kg (168 lb 6.4 oz)   SpO2 98%   BMI 28.02 kg/m²     Physical Exam  Vitals and nursing note reviewed.   Constitutional:       Appearance: Normal appearance. She is normal weight.   HENT:      Head: Normocephalic and atraumatic.      Right Ear: External ear normal.      Left Ear: External ear normal.      Mouth/Throat:      Mouth: Mucous membranes are moist.   Eyes:      Conjunctiva/sclera: Conjunctivae normal.   Cardiovascular:      Rate and Rhythm: Normal rate and regular rhythm.      Heart " sounds: Normal heart sounds.   Pulmonary:      Effort: Pulmonary effort is normal.      Breath sounds: Normal breath sounds.   Abdominal:      General: Abdomen is flat. Bowel sounds are normal.      Palpations: Abdomen is soft.      Tenderness: There is no abdominal tenderness. There is no right CVA tenderness or left CVA tenderness. Negative signs include Owens's sign.      Hernia: No hernia is present.   Musculoskeletal:         General: No swelling.      Cervical back: Normal range of motion and neck supple.   Skin:     General: Skin is warm and dry.      Capillary Refill: Capillary refill takes less than 2 seconds.      Findings: No rash.   Neurological:      General: No focal deficit present.      Mental Status: She is alert. Mental status is at baseline.   Psychiatric:         Mood and Affect: Mood normal.         Behavior: Behavior normal.         Thought Content: Thought content normal.         Judgment: Judgment normal.         Assessment/Plan   1. Acute right flank pain (Primary)  In office urinalysis was normal.  Pain is overall mild and she is otherwise feeling well.  We did discuss that this could be possibly medication side effect/constipation from newly added GLP-1 medication added 3 weeks ago  She will work on adding a stool softener to try to help with her constipation.  If symptoms persist or worsen she will let us know for further evaluation      Nilesh Glynn, DO

## 2025-09-03 ASSESSMENT — ENCOUNTER SYMPTOMS
BACK PAIN: 1
LEG PAIN: 1
BOWEL INCONTINENCE: 0
FEVER: 0
NUMBNESS: 0
TINGLING: 1
PARESTHESIAS: 1
PERIANAL NUMBNESS: 0
ABDOMINAL PAIN: 0
DYSURIA: 0
HEADACHES: 0
PARESIS: 0
WEIGHT LOSS: 0
WEAKNESS: 1

## 2025-09-04 ENCOUNTER — HOSPITAL ENCOUNTER (OUTPATIENT)
Dept: RADIOLOGY | Facility: CLINIC | Age: 55
Discharge: HOME | End: 2025-09-04
Payer: COMMERCIAL

## 2025-09-04 ENCOUNTER — OFFICE VISIT (OUTPATIENT)
Dept: PRIMARY CARE | Facility: CLINIC | Age: 55
End: 2025-09-04
Payer: COMMERCIAL

## 2025-09-04 VITALS
TEMPERATURE: 97.8 F | WEIGHT: 162.2 LBS | SYSTOLIC BLOOD PRESSURE: 111 MMHG | DIASTOLIC BLOOD PRESSURE: 70 MMHG | BODY MASS INDEX: 26.99 KG/M2 | HEART RATE: 80 BPM | OXYGEN SATURATION: 93 %

## 2025-09-04 DIAGNOSIS — M54.32 LEFT SIDED SCIATICA: ICD-10-CM

## 2025-09-04 DIAGNOSIS — E78.00 HIGH CHOLESTEROL: ICD-10-CM

## 2025-09-04 DIAGNOSIS — Z13.220 SCREENING, LIPID: ICD-10-CM

## 2025-09-04 DIAGNOSIS — M54.32 LEFT SIDED SCIATICA: Primary | ICD-10-CM

## 2025-09-04 DIAGNOSIS — Z13.1 SCREENING FOR DIABETES MELLITUS: ICD-10-CM

## 2025-09-04 PROBLEM — M54.50 ACUTE LOW BACK PAIN: Status: RESOLVED | Noted: 2025-01-03 | Resolved: 2025-09-04

## 2025-09-04 PROBLEM — B96.89 ACUTE BACTERIAL BRONCHITIS: Status: RESOLVED | Noted: 2025-01-03 | Resolved: 2025-09-04

## 2025-09-04 PROBLEM — M25.9 DISORDER OF ANKLE: Status: RESOLVED | Noted: 2025-01-03 | Resolved: 2025-09-04

## 2025-09-04 PROBLEM — J20.8 ACUTE BACTERIAL BRONCHITIS: Status: RESOLVED | Noted: 2025-01-03 | Resolved: 2025-09-04

## 2025-09-04 PROBLEM — M25.519 SHOULDER PAIN: Status: RESOLVED | Noted: 2025-01-03 | Resolved: 2025-09-04

## 2025-09-04 PROBLEM — R59.0 CERVICAL LYMPHADENOPATHY: Status: RESOLVED | Noted: 2025-01-03 | Resolved: 2025-09-04

## 2025-09-04 PROBLEM — N28.9 RENAL INSUFFICIENCY: Status: RESOLVED | Noted: 2023-05-09 | Resolved: 2025-09-04

## 2025-09-04 PROCEDURE — 90656 IIV3 VACC NO PRSV 0.5 ML IM: CPT | Performed by: STUDENT IN AN ORGANIZED HEALTH CARE EDUCATION/TRAINING PROGRAM

## 2025-09-04 PROCEDURE — 90471 IMMUNIZATION ADMIN: CPT | Performed by: STUDENT IN AN ORGANIZED HEALTH CARE EDUCATION/TRAINING PROGRAM

## 2025-09-04 PROCEDURE — 99213 OFFICE O/P EST LOW 20 MIN: CPT | Performed by: STUDENT IN AN ORGANIZED HEALTH CARE EDUCATION/TRAINING PROGRAM

## 2025-09-04 PROCEDURE — 72110 X-RAY EXAM L-2 SPINE 4/>VWS: CPT

## 2025-09-04 PROCEDURE — 73502 X-RAY EXAM HIP UNI 2-3 VIEWS: CPT | Mod: LEFT SIDE | Performed by: RADIOLOGY

## 2025-09-04 PROCEDURE — 1036F TOBACCO NON-USER: CPT | Performed by: STUDENT IN AN ORGANIZED HEALTH CARE EDUCATION/TRAINING PROGRAM

## 2025-09-04 PROCEDURE — 72110 X-RAY EXAM L-2 SPINE 4/>VWS: CPT | Performed by: RADIOLOGY

## 2025-09-04 PROCEDURE — 73502 X-RAY EXAM HIP UNI 2-3 VIEWS: CPT | Mod: LT

## 2025-09-04 RX ORDER — IBUPROFEN 800 MG/1
800 TABLET, FILM COATED ORAL 3 TIMES DAILY PRN
Qty: 21 TABLET | Refills: 0 | Status: SHIPPED | OUTPATIENT
Start: 2025-09-04 | End: 2025-09-11

## 2025-09-04 RX ORDER — CLOFIBRATE 500 MG
CAPSULE ORAL
COMMUNITY

## 2025-09-04 RX ORDER — CYCLOBENZAPRINE HCL 5 MG
5 TABLET ORAL NIGHTLY PRN
Qty: 30 TABLET | Refills: 0 | Status: SHIPPED | OUTPATIENT
Start: 2025-09-04 | End: 2025-11-03

## 2025-09-04 RX ORDER — ONDANSETRON 4 MG/1
TABLET, FILM COATED ORAL AS NEEDED
COMMUNITY
Start: 2025-07-14

## 2025-09-04 ASSESSMENT — PATIENT HEALTH QUESTIONNAIRE - PHQ9
1. LITTLE INTEREST OR PLEASURE IN DOING THINGS: NOT AT ALL
SUM OF ALL RESPONSES TO PHQ9 QUESTIONS 1 & 2: 0
2. FEELING DOWN, DEPRESSED OR HOPELESS: NOT AT ALL

## 2025-09-04 ASSESSMENT — ENCOUNTER SYMPTOMS
HEADACHES: 0
HIP PAIN: 1
BACK PAIN: 1
PERIANAL NUMBNESS: 0
DYSURIA: 0
BOWEL INCONTINENCE: 0
ABDOMINAL PAIN: 0
LEG PAIN: 1
NUMBNESS: 0
WEAKNESS: 1
TINGLING: 1
WEIGHT LOSS: 0
FEVER: 0
PARESIS: 0
PARESTHESIAS: 1

## 2025-12-10 ENCOUNTER — APPOINTMENT (OUTPATIENT)
Dept: PRIMARY CARE | Facility: CLINIC | Age: 55
End: 2025-12-10
Payer: COMMERCIAL